# Patient Record
Sex: FEMALE | Race: WHITE | NOT HISPANIC OR LATINO | Employment: FULL TIME | URBAN - METROPOLITAN AREA
[De-identification: names, ages, dates, MRNs, and addresses within clinical notes are randomized per-mention and may not be internally consistent; named-entity substitution may affect disease eponyms.]

---

## 2017-11-09 ENCOUNTER — GENERIC CONVERSION - ENCOUNTER (OUTPATIENT)
Dept: OTHER | Facility: OTHER | Age: 47
End: 2017-11-09

## 2018-01-11 NOTE — RESULT NOTES
Dear Jessica Rodriguez,   Your test results have returned and are listed below:      Discussion/Summary  Your results show some abnormalities  Your vitamin A level is elevated, which can lead to toxicity  Symptoms of having a high vitamin A include: nausea, vomiting, blurred vision, headache and vertigo  If you are experiencing these symptoms, please make an appointment to see your primary care provider  Discontinue all liver consumption and vitamin/mineral supplements that contain vitamin A   If you are taking a hair health vitamin, check to see if it contains vitamin A  It it contains additional vitamn A, discontinue taking it  A lab slip is enclosed to recheck your level again in one month  Your ferritin level is low, which is an indicator of your iron stores  Your iron stores are low which can lead to anemia  Recommend that you take a high potency iron supplement (65 mg of elemental iron) once per day for two weeks, then increase to twice per day for 2 to 4 weeks and increase to three times per day as tolerated  Iron is better absorbed with a source of vitamin C, such as orange juice  Calcium, coffee, and tea decrease absorption of iron and should be spaced 2 hours apart from iron supplements  Proton pump inhibitors ( such as omeprazole, lansoprazole, esomeprazole) should be taken separately from iron supplements as they decrease absorption of iron  As iron can be constipating, it is suggested that you take one to two over the counter stool softeners per day  Enclosed is a lab slip to recheck your levels again in 3 months  Your cholesterol/triglycerides are elevated, which can increase your risk of cardiovascular disease  Please continue to follow up with your primary care physician for further monitoring and evaluation  Your levels will be rechecked again at your annual follow up appointment          If you have any questions, please don't hesitate to call the office     Sincerely, Signatures   Electronically signed by : MARIE Streeter; Aug 25 2016  4:13PM EST                       (Author)    Electronically signed by :  NEO Herbert ; Aug 26 2016 11:22AM EST

## 2018-02-13 NOTE — MISCELLANEOUS
Message   Recorded as Task   Date: 11/08/2017 12:28 PM, Created By: Caity Plummer   Task Name: Follow Up   Assigned To: Annika Castillo   Regarding Patient: Burke Courtney, Status: Active   CommentArty Babe - 08 Nov 2017 12:28 PM     TASK CREATED  Please call patient to schedule 3 year f/u appointment with office  Thank you  Per assigned task, I left message for Nadja Maldonado patient about scheduling an appointment at our office since patient is overdue for a follow up  Active Problems    1  Acute maxillary sinusitis (461 0) (J01 00)   2  Concussion (850 9) (S06 0X9A)   3  Eczema (692 9) (L30 9)   4  Facial laceration (873 40) (S01 81XA)   5  Flu vaccine need (V04 81) (Z23)   6  Hypervitaminosis A (278 2) (E67 0)   7  Low ferritin level (790 6) (R79 0)   8  Need for prophylactic vaccination and inoculation against influenza (V04 81) (Z23)   9  Obesity (278 00) (E66 9)   10  Postgastrectomy malabsorption (579 3) (K91 2,Z90 3)   11  Pre-operative cardiovascular examination (V72 81) (Z01 810)   12  S/P laparoscopic sleeve gastrectomy (V45 86) (Z98 84)   13  Syncope (780 2) (R55)   14  Weight gain (783 1) (R63 5)    Current Meds   1  Amoxicillin 875 MG Oral Tablet; TAKE 1 TABLET EVERY 12 HOURS DAILY; Therapy: 09CQA1001 to (Evaluate:17May2016)  Requested for: 35XFX1022; Last   Rx:07May2016 Ordered   2  Biotin 5000 MCG Oral Capsule; TAKE 1 CAPSULE DAILY; Therapy: (Recorded:87Zdb7886) to Recorded   3  Calcium Citrate + D TABS Recorded   4  Fluticasone Propionate 50 MCG/ACT Nasal Suspension (Flonase); USE 2 SPRAYS IN   EACH NOSTRIL ONCE DAILY; Therapy: 57PCN8389 to (Evaluate:06Jun2016)  Requested for: 26DZV7512; Last   Rx:07May2016 Ordered   5  Iron Supplement TABS; Therapy: (Recorded:12Ktr8960) to Recorded   6  Multiple Vitamin TABS; Therapy: (Recorded:07Bvk7236) to Recorded   7  ZyrTEC Allergy 10 MG Oral Tablet; Therapy: (Recorded:08Kud8792) to Recorded    Allergies    1   Sulfa Drugs    Signatures   Electronically signed by : Lady Davis, ; Nov 9 2017 11:13AM EST                       (Author)

## 2018-10-19 ENCOUNTER — APPOINTMENT (OUTPATIENT)
Dept: LAB | Age: 48
End: 2018-10-19
Payer: COMMERCIAL

## 2018-10-19 ENCOUNTER — TRANSCRIBE ORDERS (OUTPATIENT)
Dept: ADMINISTRATIVE | Facility: HOSPITAL | Age: 48
End: 2018-10-19

## 2018-10-19 DIAGNOSIS — R23.2 HOT FLASHES: ICD-10-CM

## 2018-10-19 DIAGNOSIS — R23.2 HOT FLASHES: Primary | ICD-10-CM

## 2018-10-19 LAB — TSH SERPL DL<=0.05 MIU/L-ACNC: 1.55 UIU/ML (ref 0.36–3.74)

## 2018-10-19 PROCEDURE — 36415 COLL VENOUS BLD VENIPUNCTURE: CPT

## 2018-10-19 PROCEDURE — 84443 ASSAY THYROID STIM HORMONE: CPT

## 2020-01-29 ENCOUNTER — OFFICE VISIT (OUTPATIENT)
Dept: URGENT CARE | Age: 50
End: 2020-01-29
Payer: COMMERCIAL

## 2020-01-29 VITALS
DIASTOLIC BLOOD PRESSURE: 89 MMHG | WEIGHT: 205 LBS | TEMPERATURE: 98.4 F | SYSTOLIC BLOOD PRESSURE: 123 MMHG | HEIGHT: 63 IN | RESPIRATION RATE: 18 BRPM | BODY MASS INDEX: 36.32 KG/M2 | OXYGEN SATURATION: 100 % | HEART RATE: 74 BPM

## 2020-01-29 DIAGNOSIS — R68.89 FLU-LIKE SYMPTOMS: ICD-10-CM

## 2020-01-29 DIAGNOSIS — R05.9 COUGH: ICD-10-CM

## 2020-01-29 DIAGNOSIS — J32.9 VIRAL SINUSITIS: Primary | ICD-10-CM

## 2020-01-29 DIAGNOSIS — B97.89 VIRAL SINUSITIS: Primary | ICD-10-CM

## 2020-01-29 LAB — S PYO AG THROAT QL: NEGATIVE

## 2020-01-29 PROCEDURE — 87880 STREP A ASSAY W/OPTIC: CPT | Performed by: NURSE PRACTITIONER

## 2020-01-29 PROCEDURE — 99213 OFFICE O/P EST LOW 20 MIN: CPT | Performed by: NURSE PRACTITIONER

## 2020-01-29 PROCEDURE — 87070 CULTURE OTHR SPECIMN AEROBIC: CPT | Performed by: NURSE PRACTITIONER

## 2020-01-29 RX ORDER — MULTIVITAMIN
TABLET ORAL
COMMUNITY

## 2020-01-29 RX ORDER — FERROUS SULFATE 325(65) MG
1 TABLET ORAL DAILY
COMMUNITY

## 2020-01-29 RX ORDER — PREDNISONE 10 MG/1
TABLET ORAL
Qty: 21 TABLET | Refills: 0 | Status: SHIPPED | OUTPATIENT
Start: 2020-01-29 | End: 2021-04-02

## 2020-01-29 RX ORDER — BENZONATATE 100 MG/1
100 CAPSULE ORAL 3 TIMES DAILY PRN
Qty: 20 CAPSULE | Refills: 0 | Status: SHIPPED | OUTPATIENT
Start: 2020-01-29 | End: 2021-04-02

## 2020-01-29 NOTE — PATIENT INSTRUCTIONS
Take steroids in morning  Rest increase fluids  Tylenol motrin for fever and pain   Take zyrtec, allegra, or Claritin daily  Use flonase 1-2 sprays in each nare daily   Use nasal saline to the nose,   Use humidifer in room  Symptoms worsen go to ER  Rest      Influenza   WHAT YOU NEED TO KNOW:   Influenza (the flu) is an infection caused by the influenza virus  The flu is easily spread when an infected person coughs, sneezes, or has close contact with others  You may be able to spread the flu to others for 1 week or longer after signs or symptoms appear  DISCHARGE INSTRUCTIONS:   Call 911 for any of the following:   · You have trouble breathing, and your lips look purple or blue  · You have a seizure  Return to the emergency department if:   · You are dizzy, or you are urinating less or not at all  · You have a headache with a stiff neck, and you feel tired or confused  · You have new pain or pressure in your chest     · Your symptoms, such as shortness of breath, vomiting, or diarrhea, get worse  · Your symptoms, such as fever and coughing, seem to get better, but then get worse  Contact your healthcare provider if:   · You have new muscle pain or weakness  · You have questions or concerns about your condition or care  Medicines: You may need any of the following:  · Acetaminophen  decreases pain and fever  It is available without a doctor's order  Ask how much to take and how often to take it  Follow directions  Acetaminophen can cause liver damage if not taken correctly  · NSAIDs , such as ibuprofen, help decrease swelling, pain, and fever  This medicine is available with or without a doctor's order  NSAIDs can cause stomach bleeding or kidney problems in certain people  If you take blood thinner medicine, always ask your healthcare provider if NSAIDs are safe for you  Always read the medicine label and follow directions  · Antivirals  help fight a viral infection      · Take your medicine as directed  Contact your healthcare provider if you think your medicine is not helping or if you have side effects  Tell him or her if you are allergic to any medicine  Keep a list of the medicines, vitamins, and herbs you take  Include the amounts, and when and why you take them  Bring the list or the pill bottles to follow-up visits  Carry your medicine list with you in case of an emergency  Rest  as much as you can to help you recover  Drink liquids as directed  to help prevent dehydration  Ask how much liquid to drink each day and which liquids are best for you  Prevent the spread of influenza:   · Wash your hands often  Use soap and water  Wash your hands after you use the bathroom, change a child's diapers, or sneeze  Wash your hands before you prepare or eat food  Use gel hand cleanser when soap and water are not available  Do not touch your eyes, nose, or mouth unless you have washed your hands first            · Cover your mouth when you sneeze or cough  Cough into a tissue or the bend of your arm  · Clean shared items with a germ-killing   Clean table surfaces, doorknobs, and light switches  Do not share towels, silverware, and dishes with people who are sick  Wash bed sheets, towels, silverware, and dishes with soap and water  · Wear a mask  over your mouth and nose if you are sick or are near anyone who is sick  · Stay away from others  if you are sick  · Influenza vaccine  helps prevent influenza (flu)  Everyone older than 6 months should get a yearly influenza vaccine  Get the vaccine as soon as it is available, usually in September or October each year  Follow up with your healthcare provider as directed:  Write down your questions so you remember to ask them during your visits  © 2017 [employer] Information is for End User's use only and may not be sold, redistributed or otherwise used for commercial purposes   All illustrations and images included in [employer]fort[employer] 605 are the copyrighted property of [employer] , [employer]  or [employer]  The above information is an  only  It is not intended as medical advice for individual conditions or treatments  Talk to your doctor, nurse or pharmacist before following any medical regimen to see if it is safe and effective for you

## 2020-01-29 NOTE — PROGRESS NOTES
NAME: Moses Go is a 51 y o  female  : 1970    MRN: [id number]    /89 (BP Location: Right arm, Patient Position: Sitting, Cuff Size: Adult)   Pulse 74   Temp 98 4 °F (36 9 °C) (Tympanic)   Resp 18   Ht 5' 3" (1 6 m)   Wt 93 kg (205 lb)   SpO2 100%   BMI 36 31 kg/m²     Assessment and Plan   Viral sinusitis [J32 9, B97 89]  1  Viral sinusitis  predniSONE 10 mg tablet   2  Flu-like symptoms     3  Cough  benzonatate (TESSALON PERLES) 100 mg capsule    POCT rapid strepA    Throat culture       Winter was seen today for cough  Diagnoses and all orders for this visit:    Viral sinusitis  -     predniSONE 10 mg tablet; Take 6 tablets today and decrease by one tablet daily until gone    Flu-like symptoms    Cough  -     benzonatate (TESSALON PERLES) 100 mg capsule; Take 1 capsule (100 mg total) by mouth 3 (three) times a day as needed for cough  -     POCT rapid strepA  -     Throat culture        Patient Instructions   Patient Instructions     Take steroids in morning  Rest increase fluids  Tylenol motrin for fever and pain   Take zyrtec, allegra, or Claritin daily  Use flonase 1-2 sprays in each nare daily   Use nasal saline to the nose,   Use humidifer in room  Symptoms worsen go to ER  Rest      Influenza   WHAT YOU NEED TO KNOW:   Influenza (the flu) is an infection caused by the influenza virus  The flu is easily spread when an infected person coughs, sneezes, or has close contact with others  You may be able to spread the flu to others for 1 week or longer after signs or symptoms appear  DISCHARGE INSTRUCTIONS:   Call 911 for any of the following:   · You have trouble breathing, and your lips look purple or blue  · You have a seizure  Return to the emergency department if:   · You are dizzy, or you are urinating less or not at all  · You have a headache with a stiff neck, and you feel tired or confused      · You have new pain or pressure in your chest     · Your symptoms, such as shortness of breath, vomiting, or diarrhea, get worse  · Your symptoms, such as fever and coughing, seem to get better, but then get worse  Contact your healthcare provider if:   · You have new muscle pain or weakness  · You have questions or concerns about your condition or care  Medicines: You may need any of the following:  · Acetaminophen  decreases pain and fever  It is available without a doctor's order  Ask how much to take and how often to take it  Follow directions  Acetaminophen can cause liver damage if not taken correctly  · NSAIDs , such as ibuprofen, help decrease swelling, pain, and fever  This medicine is available with or without a doctor's order  NSAIDs can cause stomach bleeding or kidney problems in certain people  If you take blood thinner medicine, always ask your healthcare provider if NSAIDs are safe for you  Always read the medicine label and follow directions  · Antivirals  help fight a viral infection  · Take your medicine as directed  Contact your healthcare provider if you think your medicine is not helping or if you have side effects  Tell him or her if you are allergic to any medicine  Keep a list of the medicines, vitamins, and herbs you take  Include the amounts, and when and why you take them  Bring the list or the pill bottles to follow-up visits  Carry your medicine list with you in case of an emergency  Rest  as much as you can to help you recover  Drink liquids as directed  to help prevent dehydration  Ask how much liquid to drink each day and which liquids are best for you  Prevent the spread of influenza:   · Wash your hands often  Use soap and water  Wash your hands after you use the bathroom, change a child's diapers, or sneeze  Wash your hands before you prepare or eat food  Use gel hand cleanser when soap and water are not available   Do not touch your eyes, nose, or mouth unless you have washed your hands first            · Cover your mouth when you sneeze or cough  Cough into a tissue or the bend of your arm  · Clean shared items with a germ-killing   Clean table surfaces, doorknobs, and light switches  Do not share towels, silverware, and dishes with people who are sick  Wash bed sheets, towels, silverware, and dishes with soap and water  · Wear a mask  over your mouth and nose if you are sick or are near anyone who is sick  · Stay away from others  if you are sick  · Influenza vaccine  helps prevent influenza (flu)  Everyone older than 6 months should get a yearly influenza vaccine  Get the vaccine as soon as it is available, usually in September or October each year  Follow up with your healthcare provider as directed:  Write down your questions so you remember to ask them during your visits  © 2017 [employer] Information is for End User's use only and may not be sold, redistributed or otherwise used for commercial purposes  All illustrations and images included in CareNotes® are the copyrighted property of [employer]  or [employer]  The above information is an  only  It is not intended as medical advice for individual conditions or treatments  Talk to your doctor, nurse or pharmacist before following any medical regimen to see if it is safe and effective for you  Proceed to ER if symptoms worsen  Chief Complaint     Chief Complaint   Patient presents with    Cough     pt c/o cough, bodyaches, b/l ear fullness, and sore throat x5 days  pt did get flu shot this year  pt taking tylenol cold  History of Present Illness     52 yo female here today with cough and congestion for the past five days, no fever, and has had body aches and muscle aches, had a flu shot this season as well  She has been taking tylenol sinus for her symptoms little relief noted, she has b/l ear fullness  Review of Systems   Review of Systems   Constitutional: Positive for fatigue  Negative for chills and fever  HENT: Positive for congestion, ear pain (b/l), postnasal drip and sore throat  Negative for rhinorrhea, sinus pressure and sinus pain  Eyes: Positive for pain  Respiratory: Positive for cough  Negative for chest tightness and shortness of breath  Cardiovascular: Negative for chest pain  Gastrointestinal: Negative for diarrhea, nausea and vomiting  Genitourinary: Negative  Musculoskeletal: Positive for myalgias  Skin: Negative  Neurological: Positive for headaches  Current Medications       Current Outpatient Medications:     Calcium Citrate-Vitamin D (CALCIUM CITRATE + D) 250-200 MG-UNIT TABS, Take by mouth, Disp: , Rfl:     Cetirizine HCl 10 MG CAPS, Take 10 mg by mouth, Disp: , Rfl:     ferrous sulfate (IRON SUPPLEMENT) 325 (65 Fe) mg tablet, Take by mouth, Disp: , Rfl:     Multiple Vitamin tablet, Take by mouth, Disp: , Rfl:     benzonatate (TESSALON PERLES) 100 mg capsule, Take 1 capsule (100 mg total) by mouth 3 (three) times a day as needed for cough, Disp: 20 capsule, Rfl: 0    predniSONE 10 mg tablet, Take 6 tablets today and decrease by one tablet daily until gone, Disp: 21 tablet, Rfl: 0    Current Allergies     Allergies as of 01/29/2020 - Reviewed 01/29/2020   Allergen Reaction Noted    Sulfa antibiotics  07/12/2012              History reviewed  No pertinent past medical history  Past Surgical History:   Procedure Laterality Date    BARIATRIC SURGERY  2014    BREAST BIOPSY  1995    HYSTERECTOMY  2018       History reviewed  No pertinent family history  Medications have been verified      The following portions of the patient's history were reviewed and updated as appropriate: allergies, current medications, past family history, past medical history, past social history, past surgical history and problem list     Objective   /89 (BP Location: Right arm, Patient Position: Sitting, Cuff Size: Adult)   Pulse 74   Temp 98 4 °F (36 9 °C) (Tympanic)   Resp 18   Ht 5' 3" (1 6 m)   Wt 93 kg (205 lb)   SpO2 100%   BMI 36 31 kg/m²      Physical Exam     Physical Exam   Constitutional: She is oriented to person, place, and time  She appears well-developed and well-nourished  She is cooperative  HENT:   Head: Normocephalic  Right Ear: Hearing, tympanic membrane, external ear and ear canal normal    Left Ear: Hearing, tympanic membrane, external ear and ear canal normal    Nose: Mucosal edema (pink and boggy) present  Right sinus exhibits no maxillary sinus tenderness  Left sinus exhibits no maxillary sinus tenderness  Mouth/Throat: Uvula is midline and mucous membranes are normal  No posterior oropharyngeal edema or posterior oropharyngeal erythema  Tonsils are 0 on the right  Tonsils are 0 on the left  No tonsillar exudate  Eyes: Pupils are equal, round, and reactive to light  Conjunctivae and EOM are normal    Neck: Normal range of motion  No erythema present  No thyroid mass present  Cardiovascular: Normal rate, regular rhythm and normal heart sounds  Pulmonary/Chest: Effort normal and breath sounds normal  No stridor  She has no decreased breath sounds  She has no wheezes  Musculoskeletal:   Body aches and muscle aches     Neurological: She is alert and oriented to person, place, and time         NEETA Montgomery

## 2020-01-29 NOTE — LETTER
January 29, 2020     Patient: Maria Esther Begin   YOB: 1970   Date of Visit: 1/29/2020       To Whom It May Concern: It is my medical opinion that Eli Apley may return to work on 01/31/2020 if fever free if not please excuse till 02/03/2020      Sincerely,        Inga Primrose, CRNP    CC: No Recipients

## 2020-01-31 LAB — BACTERIA THROAT CULT: NORMAL

## 2020-12-28 ENCOUNTER — OFFICE VISIT (OUTPATIENT)
Dept: URGENT CARE | Age: 50
End: 2020-12-28
Payer: COMMERCIAL

## 2020-12-28 VITALS
TEMPERATURE: 98 F | BODY MASS INDEX: 36.32 KG/M2 | WEIGHT: 205 LBS | HEART RATE: 78 BPM | OXYGEN SATURATION: 100 % | RESPIRATION RATE: 18 BRPM | HEIGHT: 63 IN

## 2020-12-28 DIAGNOSIS — B34.9 VIRAL SYNDROME: Primary | ICD-10-CM

## 2020-12-28 PROCEDURE — 99213 OFFICE O/P EST LOW 20 MIN: CPT | Performed by: PHYSICIAN ASSISTANT

## 2020-12-28 PROCEDURE — U0003 INFECTIOUS AGENT DETECTION BY NUCLEIC ACID (DNA OR RNA); SEVERE ACUTE RESPIRATORY SYNDROME CORONAVIRUS 2 (SARS-COV-2) (CORONAVIRUS DISEASE [COVID-19]), AMPLIFIED PROBE TECHNIQUE, MAKING USE OF HIGH THROUGHPUT TECHNOLOGIES AS DESCRIBED BY CMS-2020-01-R: HCPCS | Performed by: PHYSICIAN ASSISTANT

## 2020-12-29 LAB — SARS-COV-2 RNA SPEC QL NAA+PROBE: DETECTED

## 2021-03-26 DIAGNOSIS — Z23 ENCOUNTER FOR IMMUNIZATION: ICD-10-CM

## 2021-03-29 ENCOUNTER — RA CDI HCC (OUTPATIENT)
Dept: OTHER | Facility: HOSPITAL | Age: 51
End: 2021-03-29

## 2021-03-29 NOTE — PROGRESS NOTES
[facility], [place] [facility]  coding oppertunities          Chart reviewed, no opportunity found: CHART REVIEWED, NO OPPORTUNITY FOUND

## 2021-04-02 ENCOUNTER — APPOINTMENT (OUTPATIENT)
Dept: RADIOLOGY | Facility: MEDICAL CENTER | Age: 51
End: 2021-04-02
Payer: COMMERCIAL

## 2021-04-02 ENCOUNTER — OFFICE VISIT (OUTPATIENT)
Dept: FAMILY MEDICINE CLINIC | Facility: CLINIC | Age: 51
End: 2021-04-02
Payer: COMMERCIAL

## 2021-04-02 VITALS
WEIGHT: 212 LBS | DIASTOLIC BLOOD PRESSURE: 80 MMHG | HEIGHT: 63 IN | SYSTOLIC BLOOD PRESSURE: 128 MMHG | HEART RATE: 60 BPM | BODY MASS INDEX: 37.56 KG/M2 | OXYGEN SATURATION: 98 % | TEMPERATURE: 97.3 F

## 2021-04-02 DIAGNOSIS — K91.2 POSTGASTRECTOMY MALABSORPTION: ICD-10-CM

## 2021-04-02 DIAGNOSIS — Z90.3 POSTGASTRECTOMY MALABSORPTION: ICD-10-CM

## 2021-04-02 DIAGNOSIS — R79.0 LOW FERRITIN LEVEL: ICD-10-CM

## 2021-04-02 DIAGNOSIS — Z12.4 CERVICAL CANCER SCREENING: ICD-10-CM

## 2021-04-02 DIAGNOSIS — Z11.4 SCREENING FOR HIV (HUMAN IMMUNODEFICIENCY VIRUS): ICD-10-CM

## 2021-04-02 DIAGNOSIS — G47.00 INSOMNIA, UNSPECIFIED TYPE: ICD-10-CM

## 2021-04-02 DIAGNOSIS — M25.562 CHRONIC PAIN OF LEFT KNEE: ICD-10-CM

## 2021-04-02 DIAGNOSIS — E67.0 HYPERVITAMINOSIS A: ICD-10-CM

## 2021-04-02 DIAGNOSIS — G89.29 CHRONIC PAIN OF LEFT KNEE: ICD-10-CM

## 2021-04-02 DIAGNOSIS — E66.09 CLASS 2 OBESITY DUE TO EXCESS CALORIES WITH BODY MASS INDEX (BMI) OF 37.0 TO 37.9 IN ADULT, UNSPECIFIED WHETHER SERIOUS COMORBIDITY PRESENT: ICD-10-CM

## 2021-04-02 DIAGNOSIS — Z76.89 ENCOUNTER TO ESTABLISH CARE: Primary | ICD-10-CM

## 2021-04-02 PROCEDURE — 1036F TOBACCO NON-USER: CPT | Performed by: PHYSICIAN ASSISTANT

## 2021-04-02 PROCEDURE — 73564 X-RAY EXAM KNEE 4 OR MORE: CPT

## 2021-04-02 PROCEDURE — 3725F SCREEN DEPRESSION PERFORMED: CPT | Performed by: PHYSICIAN ASSISTANT

## 2021-04-02 PROCEDURE — 3008F BODY MASS INDEX DOCD: CPT | Performed by: PHYSICIAN ASSISTANT

## 2021-04-02 PROCEDURE — 99203 OFFICE O/P NEW LOW 30 MIN: CPT | Performed by: PHYSICIAN ASSISTANT

## 2021-04-02 RX ORDER — CHOLECALCIFEROL (VITAMIN D3) 125 MCG
1 CAPSULE ORAL 2 TIMES DAILY
COMMUNITY
Start: 2014-07-07

## 2021-04-02 RX ORDER — CHLORAL HYDRATE 500 MG
1 CAPSULE ORAL 2 TIMES DAILY
COMMUNITY
Start: 2020-01-01

## 2021-04-02 RX ORDER — DIPHENHYDRAMINE HYDROCHLORIDE 25 MG/1
1 TABLET ORAL 2 TIMES DAILY
COMMUNITY
Start: 2014-07-07

## 2021-04-02 RX ORDER — CYANOCOBALAMIN (VITAMIN B-12) 1000 MCG
1 TABLET ORAL DAILY
COMMUNITY
Start: 2021-02-01

## 2021-04-02 RX ORDER — PROPRANOLOL/HYDROCHLOROTHIAZID 40 MG-25MG
1 TABLET ORAL DAILY
COMMUNITY

## 2021-04-03 ENCOUNTER — APPOINTMENT (OUTPATIENT)
Dept: LAB | Age: 51
End: 2021-04-03
Payer: COMMERCIAL

## 2021-04-03 DIAGNOSIS — Z90.3 POSTGASTRECTOMY MALABSORPTION: ICD-10-CM

## 2021-04-03 DIAGNOSIS — Z11.4 SCREENING FOR HIV (HUMAN IMMUNODEFICIENCY VIRUS): ICD-10-CM

## 2021-04-03 DIAGNOSIS — K91.2 POSTGASTRECTOMY MALABSORPTION: ICD-10-CM

## 2021-04-03 PROBLEM — Z91.411: Status: ACTIVE | Noted: 2021-04-03

## 2021-04-03 PROBLEM — G47.00 INSOMNIA: Status: ACTIVE | Noted: 2021-04-03

## 2021-04-03 PROBLEM — IMO0002: Status: ACTIVE | Noted: 2021-04-03

## 2021-04-03 PROBLEM — M25.562 LEFT KNEE PAIN: Status: ACTIVE | Noted: 2021-04-03

## 2021-04-03 LAB
25(OH)D3 SERPL-MCNC: 68.8 NG/ML (ref 30–100)
ALBUMIN SERPL BCP-MCNC: 3.9 G/DL (ref 3.5–5)
ALP SERPL-CCNC: 63 U/L (ref 46–116)
ALT SERPL W P-5'-P-CCNC: 24 U/L (ref 12–78)
ANION GAP SERPL CALCULATED.3IONS-SCNC: 3 MMOL/L (ref 4–13)
AST SERPL W P-5'-P-CCNC: 18 U/L (ref 5–45)
BASOPHILS # BLD AUTO: 0.05 THOUSANDS/ΜL (ref 0–0.1)
BASOPHILS NFR BLD AUTO: 1 % (ref 0–1)
BILIRUB SERPL-MCNC: 0.59 MG/DL (ref 0.2–1)
BUN SERPL-MCNC: 13 MG/DL (ref 5–25)
CALCIUM SERPL-MCNC: 9.5 MG/DL (ref 8.3–10.1)
CHLORIDE SERPL-SCNC: 102 MMOL/L (ref 100–108)
CHOLEST SERPL-MCNC: 237 MG/DL (ref 50–200)
CO2 SERPL-SCNC: 29 MMOL/L (ref 21–32)
CREAT SERPL-MCNC: 0.81 MG/DL (ref 0.6–1.3)
EOSINOPHIL # BLD AUTO: 0.23 THOUSAND/ΜL (ref 0–0.61)
EOSINOPHIL NFR BLD AUTO: 6 % (ref 0–6)
ERYTHROCYTE [DISTWIDTH] IN BLOOD BY AUTOMATED COUNT: 12.7 % (ref 11.6–15.1)
EST. AVERAGE GLUCOSE BLD GHB EST-MCNC: 103 MG/DL
FERRITIN SERPL-MCNC: 99 NG/ML (ref 8–388)
FOLATE SERPL-MCNC: >20 NG/ML (ref 3.1–17.5)
GFR SERPL CREATININE-BSD FRML MDRD: 86 ML/MIN/1.73SQ M
GLUCOSE P FAST SERPL-MCNC: 91 MG/DL (ref 65–99)
HBA1C MFR BLD: 5.2 %
HCT VFR BLD AUTO: 42.7 % (ref 34.8–46.1)
HDLC SERPL-MCNC: 98 MG/DL
HGB BLD-MCNC: 13.5 G/DL (ref 11.5–15.4)
IMM GRANULOCYTES # BLD AUTO: 0.01 THOUSAND/UL (ref 0–0.2)
IMM GRANULOCYTES NFR BLD AUTO: 0 % (ref 0–2)
IRON SERPL-MCNC: 115 UG/DL (ref 50–170)
LDLC SERPL CALC-MCNC: 127 MG/DL (ref 0–100)
LYMPHOCYTES # BLD AUTO: 1.75 THOUSANDS/ΜL (ref 0.6–4.47)
LYMPHOCYTES NFR BLD AUTO: 43 % (ref 14–44)
MAGNESIUM SERPL-MCNC: 2 MG/DL (ref 1.6–2.6)
MCH RBC QN AUTO: 30.1 PG (ref 26.8–34.3)
MCHC RBC AUTO-ENTMCNC: 31.6 G/DL (ref 31.4–37.4)
MCV RBC AUTO: 95 FL (ref 82–98)
MONOCYTES # BLD AUTO: 0.26 THOUSAND/ΜL (ref 0.17–1.22)
MONOCYTES NFR BLD AUTO: 6 % (ref 4–12)
NEUTROPHILS # BLD AUTO: 1.78 THOUSANDS/ΜL (ref 1.85–7.62)
NEUTS SEG NFR BLD AUTO: 44 % (ref 43–75)
NRBC BLD AUTO-RTO: 0 /100 WBCS
PLATELET # BLD AUTO: 243 THOUSANDS/UL (ref 149–390)
PMV BLD AUTO: 10.6 FL (ref 8.9–12.7)
POTASSIUM SERPL-SCNC: 4.1 MMOL/L (ref 3.5–5.3)
PROT SERPL-MCNC: 7.6 G/DL (ref 6.4–8.2)
PTH-INTACT SERPL-MCNC: 39.7 PG/ML (ref 18.4–80.1)
RBC # BLD AUTO: 4.48 MILLION/UL (ref 3.81–5.12)
SODIUM SERPL-SCNC: 134 MMOL/L (ref 136–145)
TIBC SERPL-MCNC: 341 UG/DL (ref 250–450)
TRIGL SERPL-MCNC: 60 MG/DL
TSH SERPL DL<=0.05 MIU/L-ACNC: 1 UIU/ML (ref 0.36–3.74)
VIT B12 SERPL-MCNC: 793 PG/ML (ref 100–900)
WBC # BLD AUTO: 4.08 THOUSAND/UL (ref 4.31–10.16)

## 2021-04-03 PROCEDURE — 82746 ASSAY OF FOLIC ACID SERUM: CPT

## 2021-04-03 PROCEDURE — 83540 ASSAY OF IRON: CPT

## 2021-04-03 PROCEDURE — 85025 COMPLETE CBC W/AUTO DIFF WBC: CPT

## 2021-04-03 PROCEDURE — 83970 ASSAY OF PARATHORMONE: CPT

## 2021-04-03 PROCEDURE — 84443 ASSAY THYROID STIM HORMONE: CPT

## 2021-04-03 PROCEDURE — 82728 ASSAY OF FERRITIN: CPT

## 2021-04-03 PROCEDURE — 36415 COLL VENOUS BLD VENIPUNCTURE: CPT

## 2021-04-03 PROCEDURE — 83036 HEMOGLOBIN GLYCOSYLATED A1C: CPT

## 2021-04-03 PROCEDURE — 87389 HIV-1 AG W/HIV-1&-2 AB AG IA: CPT

## 2021-04-03 PROCEDURE — 84425 ASSAY OF VITAMIN B-1: CPT

## 2021-04-03 PROCEDURE — 83550 IRON BINDING TEST: CPT

## 2021-04-03 PROCEDURE — 82306 VITAMIN D 25 HYDROXY: CPT

## 2021-04-03 PROCEDURE — 80061 LIPID PANEL: CPT

## 2021-04-03 PROCEDURE — 80053 COMPREHEN METABOLIC PANEL: CPT

## 2021-04-03 PROCEDURE — 83735 ASSAY OF MAGNESIUM: CPT

## 2021-04-03 PROCEDURE — 82607 VITAMIN B-12: CPT

## 2021-04-03 PROCEDURE — 84590 ASSAY OF VITAMIN A: CPT

## 2021-04-03 NOTE — ASSESSMENT & PLAN NOTE
We reviewed her sleep habits and improvements that she can make with her routine such as avoiding electronics for an hour prior to bed and having a consistent bedtime  She will work on making these changes and will re-evaluate at next visit  New medication started at this time

## 2021-04-03 NOTE — ASSESSMENT & PLAN NOTE
Patient will go for labs as ordered today  She reports continuing her vitamins as previously directed

## 2021-04-03 NOTE — ASSESSMENT & PLAN NOTE
Began in November blood doing running program, improved with chiropractor and has since recurred  She was reassured that her exam was overall normal   She had tenderness in the lateral left knee, but did not have evidence of a lateral meniscal injury or tear of the lateral collateral ligament  She was sent for x-ray of the left knee  Would recommend physical therapy if x-ray is unremarkable

## 2021-04-03 NOTE — ASSESSMENT & PLAN NOTE
Status post bariatric surgery in 2014  Patient notes frustration with weight gain  She was referred to weight management for assistance with getting back on track as well as in follow-up from a letter she received last year about needing a possible EGD to evaluate for complications from the sleeve gastrectomy  BMI Counseling: Body mass index is 37 55 kg/m²  The BMI is above normal  Nutrition recommendations include decreasing overall calorie intake  Exercise recommendations include moderate aerobic physical activity for 150 minutes/week and exercising 3-5 times per week  Patient referred to weight management due to patient being obese
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
## 2021-04-05 LAB — HIV 1+2 AB+HIV1 P24 AG SERPL QL IA: NORMAL

## 2021-04-06 DIAGNOSIS — G89.29 CHRONIC PAIN OF LEFT KNEE: Primary | ICD-10-CM

## 2021-04-06 DIAGNOSIS — M25.562 CHRONIC PAIN OF LEFT KNEE: Primary | ICD-10-CM

## 2021-04-10 LAB
VIT A SERPL-MCNC: 62.7 UG/DL (ref 20.1–62)
VIT B1 BLD-SCNC: 143.4 NMOL/L (ref 66.5–200)

## 2021-05-03 ENCOUNTER — EVALUATION (OUTPATIENT)
Dept: PHYSICAL THERAPY | Facility: REHABILITATION | Age: 51
End: 2021-05-03
Payer: COMMERCIAL

## 2021-05-03 DIAGNOSIS — G89.29 CHRONIC PAIN OF LEFT KNEE: ICD-10-CM

## 2021-05-03 DIAGNOSIS — M25.562 CHRONIC PAIN OF LEFT KNEE: ICD-10-CM

## 2021-05-03 PROCEDURE — 97110 THERAPEUTIC EXERCISES: CPT | Performed by: PHYSICAL MEDICINE & REHABILITATION

## 2021-05-03 PROCEDURE — 97161 PT EVAL LOW COMPLEX 20 MIN: CPT | Performed by: PHYSICAL MEDICINE & REHABILITATION

## 2021-05-03 NOTE — PROGRESS NOTES
PT Evaluation     Today's date: 5/3/2021  Patient name: Liu Thomas  : 1970  MRN: [id number]  Referring provider: Adrienne Patel PA-C  Dx:   Encounter Diagnosis     ICD-10-CM    1  Chronic pain of left knee  M25 562 Ambulatory referral to Physical Therapy    G89 29        Start Time: 1600  Stop Time: 1645  Total time in clinic (min): 45 minutes    Assessment  Assessment details: Pt is a 53 y o  female presenting to outpatient physical therapy with Chronic pain of left knee   Pt presents with pain, decreased range of motion, decreased strength, and decreased tolerance to activity  Pt would benefit from skilled physical therapy to address limitations and to achieve goals  Thank you for this referral    Impairments: abnormal gait, abnormal or restricted ROM, impaired balance, impaired physical strength and lacks appropriate home exercise program    Symptom irritability: highUnderstanding of Dx/Px/POC: fair   Prognosis: good    Goals  ST  Patient will report 25% decrease in pain in 4 weeks  2  Patient will demonstrate 25% improvement in ROM in 4 weeks  3  Patient will demonstrate 1/2 grade improvement in strength in 4 weeks  LT  Patient will be able to perform IADLS without restriction or pain by discharge  2  Patient will be independent in HEP by discharge  3  Patient will be able to return to recreational/work duties without restriction or pain by discharge        Plan  Patient would benefit from: PT eval and skilled physical therapy  Planned modality interventions: biofeedback, cryotherapy and thermotherapy: hydrocollator packs  Planned therapy interventions: abdominal trunk stabilization, joint mobilization, manual therapy, neuromuscular re-education, patient education, postural training, strengthening, stretching, therapeutic activities, therapeutic exercise, home exercise program, gait training, balance and balance/weight bearing training  Frequency: 2x week  Duration in weeks: 4  Treatment plan discussed with: patient        Subjective Evaluation    History of Present Illness  Mechanism of injury: Pt reports experiencing R knee pain for > 1 year  She took part in a running program last year, she began having sxs 10 weeks into the program  She was treated by a chiropractor focusing on her hips, this helped reduce her pain  She had COVID in December and began feeling the pain again after she recovered  Currently she does not have pain, and she has not experienced pain for > 2 weeks  Sxs reported in the anterior knee, it feels like grinding under the knee cap  Sxs are worsened when descending stairs, or sitting  Following ambulation for 25 minutes she reports fatigue, and muscle soreness in B anterior thighs  Pain  Current pain ratin  At best pain ratin  At worst pain rating: 10  Quality: grinding  Progression: improved    Patient Goals  Patient goals for therapy: decreased pain  Patient goal: Ability to descend stairs without onset of pain  Objective     Active Range of Motion   Left Knee   Flexion: 115 degrees   Extension: 0 degrees     Right Knee   Flexion: 120 degrees   Extension: 0 degrees     Strength/Myotome Testing     Left Hip   Planes of Motion   Extension: 4  Abduction: 4-    Right Hip   Planes of Motion   Extension: 4  Abduction: 4    Left Knee   Prone flexion: 4-  Extension: 4  Quadriceps contraction: poor    Right Knee   Prone flexion: 4  Extension: 4+  Quadriceps contraction: good    Tests     Left Hip   Positive SANTOS         Flowsheet Rows      Most Recent Value   PT/OT G-Codes   Current Score  39   Projected Score  60             Precautions: none      Manuals                                                                 Neuro Re-Ed             Tandem on foam             SLS             Hip abd iso c pball                                                                 Ther Ex             Bike             Hamstring stretch             Standing gastroc stretch             Prone quad stretch              LAQ             Leg press             TB claespinozal             LE ext             LE curl                          Ther Activity             Step ups             Eccentric step downs              Gait Training                                       Modalities

## 2021-05-05 ENCOUNTER — OFFICE VISIT (OUTPATIENT)
Dept: PHYSICAL THERAPY | Facility: REHABILITATION | Age: 51
End: 2021-05-05
Payer: COMMERCIAL

## 2021-05-05 DIAGNOSIS — G89.29 CHRONIC PAIN OF LEFT KNEE: Primary | ICD-10-CM

## 2021-05-05 DIAGNOSIS — M25.562 CHRONIC PAIN OF LEFT KNEE: Primary | ICD-10-CM

## 2021-05-05 PROCEDURE — 97112 NEUROMUSCULAR REEDUCATION: CPT | Performed by: PHYSICAL MEDICINE & REHABILITATION

## 2021-05-05 PROCEDURE — 97110 THERAPEUTIC EXERCISES: CPT | Performed by: PHYSICAL MEDICINE & REHABILITATION

## 2021-05-05 NOTE — PROGRESS NOTES
Daily Note     Today's date: 2021  Patient name: Sukhwinder Beckham  : 1970  MRN: [id number]  Referring provider: Denise Simons PA-C  Dx:   Encounter Diagnosis     ICD-10-CM    1  Chronic pain of left knee  M25 562     G89 29        Start Time: 1600  Stop Time: 1640  Total time in clinic (min): 40 minutes    Subjective: Pt reports compliance with HEP and states that she has noticed a significant difference between R and L strength  Objective: See treatment diary below      Assessment: Tolerated treatment well  Patient demonstrated fatigue post treatment, exhibited good technique with therapeutic exercises and would benefit from continued PT  Verbal/tactile cueing utilized to reduce valgus collapse with leg press  Deficits in L NM control in tandem stance and SLS  Plan: Progress treatment as tolerated         Precautions: none      Manuals                                                                 Neuro Re-Ed             Tandem on foam 2 x 30" ea            SLS 5 x 10" ea            Hip abd iso c pball                                                                 Ther Ex             Bike 6'            Hamstring stretch HEP            Standing gastroc stretch HEP            Prone quad stretch  3 x 30"            LAQ HEP            Leg press 44#  2 x 10            TB clamshell GTB  2 x 8 ea            LE ext             LE curl             Standing knee flexion 10 ea            Ther Activity             Step ups nv              Eccentric step downs  nv            Gait Training                                       Modalities

## 2021-05-10 ENCOUNTER — OFFICE VISIT (OUTPATIENT)
Dept: PHYSICAL THERAPY | Facility: REHABILITATION | Age: 51
End: 2021-05-10
Payer: COMMERCIAL

## 2021-05-10 DIAGNOSIS — G89.29 CHRONIC PAIN OF LEFT KNEE: Primary | ICD-10-CM

## 2021-05-10 DIAGNOSIS — M25.562 CHRONIC PAIN OF LEFT KNEE: Primary | ICD-10-CM

## 2021-05-10 PROCEDURE — 97112 NEUROMUSCULAR REEDUCATION: CPT

## 2021-05-10 PROCEDURE — 97110 THERAPEUTIC EXERCISES: CPT

## 2021-05-10 NOTE — PROGRESS NOTES
Daily Note     Today's date: 5/10/2021  Patient name: Kay Feldman  : 1970  MRN: [id number]  Referring provider: Adrienne Ruiz PA-C  Dx:   Encounter Diagnosis     ICD-10-CM    1  Chronic pain of left knee  M25 562     G89 29                   Subjective: pt reports having a dance-a-thon over the weekend with little to no difficulty  She currently reports ability to ascend stairs with a laundry basket with less difficulty and pain  She currently reports posterior L knee pain  Objective: See treatment diary below      Assessment: Tolerated progressions and treatment well  Patient demonstrated fatigue post treatment, exhibited good technique with therapeutic exercises and would benefit from continued PT      Plan: Continue per plan of care  Progress treatment as tolerated         Precautions: none      Manuals  5/10                                                               Neuro Re-Ed             Tandem on foam 2 x 30" ea 30"x3           SLS 5 x 10" ea 10"x5 ea           Hip abd iso c pball  nv                                                               Ther Ex             Bike 6' 5'           Hamstring stretch HEP ---           Standing gastroc stretch HEP ---           Prone quad stretch  3 x 30" 30"x3           LAQ HEP ---           Leg press 44#  2 x 10 44# 2x10           TB clamshell GTB  2 x 8 ea gtb 5" 3x10           LE ext  22# 2x10           LE curl  22# 2x10           Standing knee flexion 10 ea            Ther Activity             Step ups nv   1R x10 L           Eccentric step downs  nv 0R x10 L           Gait Training                                       Modalities

## 2021-05-12 ENCOUNTER — OFFICE VISIT (OUTPATIENT)
Dept: PHYSICAL THERAPY | Facility: REHABILITATION | Age: 51
End: 2021-05-12
Payer: COMMERCIAL

## 2021-05-12 DIAGNOSIS — M25.562 CHRONIC PAIN OF LEFT KNEE: Primary | ICD-10-CM

## 2021-05-12 DIAGNOSIS — G89.29 CHRONIC PAIN OF LEFT KNEE: Primary | ICD-10-CM

## 2021-05-12 PROCEDURE — 97110 THERAPEUTIC EXERCISES: CPT | Performed by: PHYSICAL MEDICINE & REHABILITATION

## 2021-05-12 PROCEDURE — 97140 MANUAL THERAPY 1/> REGIONS: CPT | Performed by: PHYSICAL MEDICINE & REHABILITATION

## 2021-05-12 PROCEDURE — 97112 NEUROMUSCULAR REEDUCATION: CPT | Performed by: PHYSICAL MEDICINE & REHABILITATION

## 2021-05-12 NOTE — PROGRESS NOTES
Daily Note     Today's date: 2021  Patient name: Hung Berger  : 1970  MRN: [id number]  Referring provider: Rahat Pagan PA-C  Dx:   Encounter Diagnosis     ICD-10-CM    1  Chronic pain of left knee  M25 562     G89 29        Start Time: 1600  Stop Time: 1645  Total time in clinic (min): 45 minutes    Subjective: Pt states that she feels her hips are out of alignment leading to some knee irritation  She is scheduled for an appointment with her chiropractor next week  Objective: See treatment diary below      Assessment: Tolerated treatment well  Patient demonstrated fatigue post treatment, exhibited good technique with therapeutic exercises and would benefit from continued PT  Gentle isometrics and MET improved sxs in the lumbosacral region and allowed for completion of tx  Plan: Progress treatment as tolerated     Plan to transition to HEP in the upcoming week, attempt mini squats, wall sits, sit to stands in place of leg press     Precautions: none      Manuals  5/10 5/12                                                              Neuro Re-Ed             Tandem on foam 2 x 30" ea 30"x3 3 x 30"          SLS 5 x 10" ea 10"x5 ea           Hip abd iso c pball  nv                                                               Ther Ex             Bike 6' 5' 6'          Hamstring stretch HEP ---           Standing gastroc stretch HEP ---           Prone quad stretch  3 x 30" 30"x3           LAQ HEP ---           Leg press 44#  2 x 10 44# 2x10 66#  2 x 8 Wall sit/mini squat/sit to stand         TB clamshell GTB  2 x 8 ea gtb 5" 3x10           LE ext  22# 2x10 22#  3 x 10          LE curl  22# 2x10           Standing knee flexion 10 ea  2 x 10          Bridge c hip abd iso   perf HEP         Hip add iso   per HEP         Wall sit    nv         Mini squat    nv         Ther Activity             Sit to stand   nv          Step ups nv   1R x10 L           Eccentric step downs  nv 0R x10 L Gait Training                                       Modalities

## 2021-05-17 ENCOUNTER — OFFICE VISIT (OUTPATIENT)
Dept: PHYSICAL THERAPY | Facility: REHABILITATION | Age: 51
End: 2021-05-17
Payer: COMMERCIAL

## 2021-05-17 DIAGNOSIS — G89.29 CHRONIC PAIN OF LEFT KNEE: Primary | ICD-10-CM

## 2021-05-17 DIAGNOSIS — M25.562 CHRONIC PAIN OF LEFT KNEE: Primary | ICD-10-CM

## 2021-05-17 PROCEDURE — 97110 THERAPEUTIC EXERCISES: CPT

## 2021-05-17 PROCEDURE — 97112 NEUROMUSCULAR REEDUCATION: CPT

## 2021-05-17 NOTE — PROGRESS NOTES
Daily Note     Today's date: 2021  Patient name: Prem Quinonez  : 1970  MRN: [id number]  Referring provider: Felicia Thomas PA-C  Dx:   Encounter Diagnosis     ICD-10-CM    1  Chronic pain of left knee  M25 562     G89 29                   Subjective: pt reports tightness in L knee greater than R knee  She denied pain at present time  Pt reports continued difficulty with stair climbing, particularly descending stairs,      Objective: See treatment diary below      Assessment: Tolerated progressions and treatment well  Patient demonstrated fatigue post treatment, exhibited good technique with therapeutic exercises and would benefit from continued PT      Plan: Continue per plan of care  Progress treatment as tolerated         Precautions: none      Manuals  5/10 5/12 5/17                                                             Neuro Re-Ed             Tandem on foam 2 x 30" ea 30"x3 3 x 30" nv         SLS 5 x 10" ea 10"x5 ea           Hip abd iso c pball  nv                                                               Ther Ex             Bike 6' 5' 6' TM x5' 1 8 mph         Hamstring stretch HEP ---           Standing gastroc stretch HEP ---           Prone quad stretch  3 x 30" 30"x3           LAQ HEP ---                        Leg press 44#  2 x 10 44# 2x10 66#  2 x 8 Wall sit/mini squat/sit to stand         TB clamshell GTB  2 x 8 ea gtb 5" 3x10           LE ext  22# 2x10 22#  3 x 10 22#  3 x 10         LE curl  22# 2x10  22#  3 x 10         Standing knee flexion 10 ea  2 x 10          Bridge c hip abd iso   perf HEP         Hip add iso   per HEP         TB sidestepping    gtb 3 laps         Wall sit    5"2x10         Mini squat w/ TB hip abd/ext/diagonals    gtb x10 ea b/l         Ther Activity             Sit to stand   nv          Step ups nv   1R x10 L  1R x10 ea         Eccentric step downs  nv 0R x10 L  1R x10 ea         Gait Training                                       Modalities

## 2021-05-19 ENCOUNTER — OFFICE VISIT (OUTPATIENT)
Dept: PHYSICAL THERAPY | Facility: REHABILITATION | Age: 51
End: 2021-05-19
Payer: COMMERCIAL

## 2021-05-19 DIAGNOSIS — G89.29 CHRONIC PAIN OF LEFT KNEE: Primary | ICD-10-CM

## 2021-05-19 DIAGNOSIS — M25.562 CHRONIC PAIN OF LEFT KNEE: Primary | ICD-10-CM

## 2021-05-19 PROCEDURE — 97110 THERAPEUTIC EXERCISES: CPT | Performed by: PHYSICAL MEDICINE & REHABILITATION

## 2021-05-19 NOTE — PROGRESS NOTES
Daily Note     Today's date: 2021  Patient name: Marbin Anderson  : 1970  MRN: [id number]  Referring provider: Rayo Woo PA-C  Dx:   Encounter Diagnosis     ICD-10-CM    1  Chronic pain of left knee  M25 562     G89 29        Start Time:   Stop Time:   Total time in clinic (min): 45 minutes    Subjective: Pt states that her condition continues to improve  She has noticed some aching with prolonged standing,  but overall she is performing daily tasks with less difficulty  Objective: See treatment diary below      Assessment: Tolerated treatment well  Patient demonstrated fatigue post treatment, exhibited good technique with therapeutic exercises and would benefit from continued PT  Pt tolerated additions and progressions to tx without adverse sxs  Plan: Progress treatment as tolerated         Precautions: none      Manuals  5/10 5/12 5/17 5/19                                                            Neuro Re-Ed             Tandem on foam 2 x 30" ea 30"x3 3 x 30" nv         SLS 5 x 10" ea 10"x5 ea           Hip abd iso c pball  nv   c UE assist  5 x 5"                                                            Ther Ex             Bike 6' 5' 6' TM x5' 1 8 mph TM  X 6'  2 0 max        Prone quad stretch  3 x 30" 30"x3   10 x 10"        LAQ HEP ---                        Leg press 44#  2 x 10 44# 2x10 66#  2 x 8 Wall sit/mini squat/sit to stand         TB clamshell GTB  2 x 8 ea gtb 5" 3x10           LE ext  22# 2x10 22#  3 x 10 22#  3 x 10 33#  3 x 8        LE curl  22# 2x10  22#  3 x 10 33#  3 x 8        Bridge c hip abd iso   perf HEP         Hip add iso   per HEP         TB sidestepping    gtb 3 laps         Wall sit    5"2x10         Mini squat w/ TB hip abd/ext/diagonals    gtb x10 ea b/l c pball  3 x 8        Ther Activity             Sit to stand   nv          Step ups nv   1R x10 L  1R x10 ea         Eccentric step downs  nv 0R x10 L  1R x10 ea         Gait Training Modalities

## 2021-05-24 ENCOUNTER — OFFICE VISIT (OUTPATIENT)
Dept: PHYSICAL THERAPY | Facility: REHABILITATION | Age: 51
End: 2021-05-24
Payer: COMMERCIAL

## 2021-05-24 DIAGNOSIS — M25.562 CHRONIC PAIN OF LEFT KNEE: Primary | ICD-10-CM

## 2021-05-24 DIAGNOSIS — G89.29 CHRONIC PAIN OF LEFT KNEE: Primary | ICD-10-CM

## 2021-05-24 PROCEDURE — 97110 THERAPEUTIC EXERCISES: CPT

## 2021-05-24 NOTE — PROGRESS NOTES
Daily Note     Today's date: 2021  Patient name: Willam Marques  : 1970  MRN: [id number]  Referring provider: Narciso Oviedo PA-C  Dx:   Encounter Diagnosis     ICD-10-CM    1  Chronic pain of left knee  M25 562     G89 29                   Subjective: Pt reports increased swelling and soreness in B knees which she attributes to stepping outside onto her deck and her R foot feel through the board  She denied falling or injuring her knees, however, noted soreness in L quads due to how she caught herself  She noted icing yesterday which provided relief  Objective: See treatment diary below      Assessment: Tolerated treatment well  Held some exercises due to increased swelling today; resume NV as able  Patient demonstrated fatigue post treatment, exhibited good technique with therapeutic exercises and would benefit from continued PT      Plan: Continue per plan of care  Progress treatment as tolerated         Precautions: none      Manuals  5/10 5/12 5/17 5/19 5/24                                                           Neuro Re-Ed             Tandem on foam 2 x 30" ea 30"x3 3 x 30" nv         SLS 5 x 10" ea 10"x5 ea           Hip abd iso c pball  nv   c UE assist  5 x 5" c UE assist  5"x10                                                           Ther Ex             Bike 6' 5' 6' TM x5' 1 8 mph TM  X 6'  2 0 max x6 min       Prone quad stretch  3 x 30" 30"x3   10 x 10"        LAQ HEP ---                        Leg press 44#  2 x 10 44# 2x10 66#  2 x 8 Wall sit/mini squat/sit to stand         TB clamshell GTB  2 x 8 ea gtb 5" 3x10           LE ext  22# 2x10 22#  3 x 10 22#  3 x 10 33#  3 x 8 33# 3x10       LE curl  22# 2x10  22#  3 x 10 33#  3 x 8 22# 2x10       Bridge c hip abd iso   perf HEP         Hip add iso   per HEP         TB sidestepping    gtb 3 laps gtb 3 laps gtb 3 laps       Wall sit    5"2x10  nv       Mini squat w/ TB hip abd/ext/diagonals    gtb x10 ea b/l c pball  3 x 8 nv Ther Activity             Sit to stand   nv          Step ups nv   1R x10 L  1R x10 ea 1R x10 ea 1R x10ea       Eccentric step downs  nv 0R x10 L  1R x10 ea 1R x10 ea 1R x10 ea       Gait Training                                       Modalities

## 2021-05-26 ENCOUNTER — OFFICE VISIT (OUTPATIENT)
Dept: PHYSICAL THERAPY | Facility: REHABILITATION | Age: 51
End: 2021-05-26
Payer: COMMERCIAL

## 2021-05-26 DIAGNOSIS — M25.562 CHRONIC PAIN OF LEFT KNEE: Primary | ICD-10-CM

## 2021-05-26 DIAGNOSIS — G89.29 CHRONIC PAIN OF LEFT KNEE: Primary | ICD-10-CM

## 2021-05-26 PROCEDURE — 97112 NEUROMUSCULAR REEDUCATION: CPT

## 2021-05-26 PROCEDURE — 97110 THERAPEUTIC EXERCISES: CPT

## 2021-05-26 NOTE — PROGRESS NOTES
Daily Note     Today's date: 2021  Patient name: Emma Roque  : 1970  MRN: [id number]  Referring provider: Jaya Portillo PA-C  Dx:   Encounter Diagnosis     ICD-10-CM    1  Chronic pain of left knee  M25 562     G89 29                   Subjective: pt reports her knee is feeling better today  Denied pain prior to beginning today  Pt reports difficulty genuflecting during [employer] and avoid doing so because of fear  Objective: See treatment diary below      Assessment: Tolerated treatment well  Added static lunges to assist patient with genuflecting and promote increased eccentric quad strength; challenged doing so  Patient demonstrated fatigue post treatment, exhibited good technique with therapeutic exercises and would benefit from continued PT      Plan: Continue per plan of care  Progress treatment as tolerated         Precautions: none      Manuals  5/10 5/12 5/17 5/19 5/24 5/26                                                          Neuro Re-Ed             Tandem on foam 2 x 30" ea 30"x3 3 x 30" nv         SLS 5 x 10" ea 10"x5 ea           Hip abd iso c pball  nv   c UE assist  5 x 5" c UE assist  5"x10 c UE assist  5"x10                                                          Ther Ex             Bike 6' 5' 6' TM x5' 1 8 mph TM  X 6'  2 0 max x6 min x6 min      Prone quad stretch  3 x 30" 30"x3   10 x 10"        LAQ HEP ---                        Leg press 44#  2 x 10 44# 2x10 66#  2 x 8 Wall sit/mini squat/sit to stand         TB clamshell GTB  2 x 8 ea gtb 5" 3x10           LE ext  22# 2x10 22#  3 x 10 22#  3 x 10 33#  3 x 8 33# 3x10 33# 3x10  (ecc nv)      LE curl  22# 2x10  22#  3 x 10 33#  3 x 8 22# 2x10 33# 3x10      Bridge c hip abd iso   perf HEP         Hip add iso   per HEP         TB sidestepping    gtb 3 laps gtb 3 laps gtb 3 laps gtb 3 laps      Static lunge       x10 ea b/l      Wall sit    5"2x10  nv 5"2x10      Mini squat w/ TB hip abd/ext/diagonals    gtb x10 ea b/l c pball  3 x 8 nv gtb 2x10      Ther Activity             Sit to stand   nv          Step ups nv   1R x10 L  1R x10 ea 1R x10 ea 1R x10ea 2R x10 ea      Eccentric step downs  nv 0R x10 L  1R x10 ea 1R x10 ea 1R x10 ea 1R x10 ea      Gait Training                                       Modalities

## 2021-06-02 ENCOUNTER — OFFICE VISIT (OUTPATIENT)
Dept: PHYSICAL THERAPY | Facility: REHABILITATION | Age: 51
End: 2021-06-02
Payer: COMMERCIAL

## 2021-06-02 DIAGNOSIS — M25.562 CHRONIC PAIN OF LEFT KNEE: Primary | ICD-10-CM

## 2021-06-02 DIAGNOSIS — G89.29 CHRONIC PAIN OF LEFT KNEE: Primary | ICD-10-CM

## 2021-06-02 PROCEDURE — 97110 THERAPEUTIC EXERCISES: CPT

## 2021-06-02 NOTE — PROGRESS NOTES
Daily Note     Today's date: 2021  Patient name: Emma Roque  : 1970  MRN: [id number]  Referring provider: Jaya Portillo PA-C  Dx:   Encounter Diagnosis     ICD-10-CM    1  Chronic pain of left knee  M25 562     G89 29                   Subjective:  Patient reports that she was muscle sore for 2 days after her last visit but no knee pain  Patient notes that she still sometimes feels like something catches in her knee and she is hesitant to move it  She does note that this is happening less frequently  Objective: See treatment diary below      Assessment: Tolerated treatment well  Patient performed ex without reports of pain  Patient noted one episode of catching during ex     Patient demonstrated fatigue post treatment, exhibited good technique with therapeutic exercises and would benefit from continued PT to attain set goals  Plan: Continue per plan of care        Precautions: none      Manuals  5/10 5/12 5/17 5/19 5/24 5/26 6                                                         Neuro Re-Ed             Tandem on foam 2 x 30" ea 30"x3 3 x 30" nv         SLS 5 x 10" ea 10"x5 ea           Hip abd iso c pball  nv   c UE assist  5 x 5" c UE assist  5"x10 c UE assist  5"x10 c UE assist 5"x12                                                         Ther Ex             Bike 6' 5' 6' TM x5' 1 8 mph TM  X 6'  2 0 max x6 min x6 min x6 min     Prone quad stretch  3 x 30" 30"x3   10 x 10"        LAQ HEP ---                        Leg press 44#  2 x 10 44# 2x10 66#  2 x 8 Wall sit/mini squat/sit to stand         TB clamshell GTB  2 x 8 ea gtb 5" 3x10           LE ext  22# 2x10 22#  3 x 10 22#  3 x 10 33#  3 x 8 33# 3x10 33# 3x10  (ecc nv) 33# 2x10 ecc lowering     LE curl  22# 2x10  22#  3 x 10 33#  3 x 8 22# 2x10 33# 3x10 33# 3x10     Bridge c hip abd iso   perf HEP         Hip add iso   per HEP         TB sidestepping    gtb 3 laps gtb 3 laps gtb 3 laps gtb 3 laps GTB 4 laps     Static lunge       x10 ea b/l x10 b/l     Wall sit    5"2x10  nv 5"2x10      Mini squat w/ TB hip abd/ext/diagonals    gtb x10 ea b/l c pball  3 x 8 nv gtb 2x10 GTB  2x10     Ther Activity             Sit to stand   nv          Step ups nv   1R x10 L  1R x10 ea 1R x10 ea 1R x10ea 2R x10 ea 2R x12 ea     Eccentric step downs  nv 0R x10 L  1R x10 ea 1R x10 ea 1R x10 ea 1R x10 ea 1R x10 ea     Gait Training                                       Modalities

## 2021-06-07 ENCOUNTER — OFFICE VISIT (OUTPATIENT)
Dept: PHYSICAL THERAPY | Facility: REHABILITATION | Age: 51
End: 2021-06-07
Payer: COMMERCIAL

## 2021-06-07 DIAGNOSIS — M25.562 CHRONIC PAIN OF LEFT KNEE: Primary | ICD-10-CM

## 2021-06-07 DIAGNOSIS — G89.29 CHRONIC PAIN OF LEFT KNEE: Primary | ICD-10-CM

## 2021-06-07 PROCEDURE — 97110 THERAPEUTIC EXERCISES: CPT

## 2021-06-07 NOTE — PROGRESS NOTES
Daily Note     Today's date: 2021  Patient name: Naty Orosco  : 1970  MRN: [id number]  Referring provider: Ella García PA-C  Dx:   Encounter Diagnosis     ICD-10-CM    1  Chronic pain of left knee  M25 562     G89 29                   Subjective: pt reports with c/o increased anterior knee pain today  She noted getting up from her desk and turned quickly and felt a opopping sensation  She currently reports soreness/discomfort in [place]  Objective: See treatment diary below      Assessment: Tolerated treatment well  Modified treatment due to increased knee pain and reports of increased pain in [place]  Patient demonstrated fatigue post treatment, exhibited good technique with therapeutic exercises and would benefit from continued PT      Plan: Continue per plan of care  Progress treatment as tolerated         Precautions: none      Manuals  5/10 5/12 5/17 5/19 5/24 5/26 6/2 6/7                                                        Neuro Re-Ed             Tandem on foam 2 x 30" ea 30"x3 3 x 30" nv         SLS 5 x 10" ea 10"x5 ea           Hip abd iso c pball  nv   c UE assist  5 x 5" c UE assist  5"x10 c UE assist  5"x10 c UE assist 5"x12 c UE assist 5"x12                                                        Ther Ex             Bike 6' 5' 6' TM x5' 1 8 mph TM  X 6'  2 0 max x6 min x6 min x6 min x6 min    Prone quad stretch  3 x 30" 30"x3   10 x 10"    30"x3    LAQ HEP ---                        Leg press 44#  2 x 10 44# 2x10 66#  2 x 8 Wall sit/mini squat/sit to stand         [employer] hydrants         gtb x10 ea    TB bridges         gtb 5" 3x10    TB clamshell GTB  2 x 8 ea gtb 5" 3x10       gtb 5" 3x10    LE ext  22# 2x10 22#  3 x 10 22#  3 x 10 33#  3 x 8 33# 3x10 33# 3x10  (ecc nv) 33# 2x10 ecc lowering     LE curl  22# 2x10  22#  3 x 10 33#  3 x 8 22# 2x10 33# 3x10 33# 3x10     Bridge c hip abd iso   perf HEP         Hip add iso   per HEP         TB sidestepping    gtb 3 laps gtb 3 laps gtb 3 laps gtb 3 laps GTB 4 laps     Static lunge       x10 ea b/l x10 b/l     Wall sit    5"2x10  nv 5"2x10      Mini squat w/ TB hip abd/ext/diagonals    gtb x10 ea b/l c pball  3 x 8 nv gtb 2x10 GTB  2x10     Ther Activity             Sit to stand   nv          Step ups nv   1R x10 L  1R x10 ea 1R x10 ea 1R x10ea 2R x10 ea 2R x12 ea     Eccentric step downs  nv 0R x10 L  1R x10 ea 1R x10 ea 1R x10 ea 1R x10 ea 1R x10 ea     Gait Training                                       Modalities             CP         10 min

## 2021-06-09 ENCOUNTER — OFFICE VISIT (OUTPATIENT)
Dept: PHYSICAL THERAPY | Facility: REHABILITATION | Age: 51
End: 2021-06-09
Payer: COMMERCIAL

## 2021-06-09 DIAGNOSIS — G89.29 CHRONIC PAIN OF LEFT KNEE: Primary | ICD-10-CM

## 2021-06-09 DIAGNOSIS — M25.562 CHRONIC PAIN OF LEFT KNEE: Primary | ICD-10-CM

## 2021-06-09 PROCEDURE — 97110 THERAPEUTIC EXERCISES: CPT

## 2021-06-09 NOTE — PROGRESS NOTES
Daily Note     Today's date: 2021  Patient name: Roque Parry  : 1970  MRN: [id number]  Referring provider: Lord Scott PA-C  Dx:   Encounter Diagnosis     ICD-10-CM    1  Chronic pain of left knee  M25 562     G89 29                   Subjective: pt reports decreased knee pain today  She noted performing stretches and icing knee over the past couple days  Objective: See treatment diary below      Assessment: Tolerated treatment well  Resumed exercises as lsited with good response  Patient demonstrated fatigue post treatment, exhibited good technique with therapeutic exercises and would benefit from continued PT      Plan: Continue per plan of care  Progress treatment as tolerated         Precautions: none      Manuals  5/10 5/12 5/17 5/19 5/24 5/26 6/2 6/7 6/9                                                       Neuro Re-Ed             Tandem on foam 2 x 30" ea 30"x3 3 x 30" nv         SLS 5 x 10" ea 10"x5 ea           Hip abd iso c pball  nv   c UE assist  5 x 5" c UE assist  5"x10 c UE assist  5"x10 c UE assist 5"x12 c UE assist 5"x12 5" 2x10                                                       Ther Ex             Bike 6' 5' 6' TM x5' 1 8 mph TM  X 6'  2 0 max x6 min x6 min x6 min x6 min 6 min   Prone quad stretch  3 x 30" 30"x3   10 x 10"    30"x3    LAQ HEP ---                        Leg press 44#  2 x 10 44# 2x10 66#  2 x 8 Wall sit/mini squat/sit to stand         Fire hydrants         gtb x10 ea    TB bridges         gtb 5" 3x10    TB clamshell GTB  2 x 8 ea gtb 5" 3x10       gtb 5" 3x10    LE ext  22# 2x10 22#  3 x 10 22#  3 x 10 33#  3 x 8 33# 3x10 33# 3x10  (ecc nv) 33# 2x10 ecc lowering 33# 2x10 ecc lowering 33# 2x10 (ecc nv)   LE curl  22# 2x10  22#  3 x 10 33#  3 x 8 22# 2x10 33# 3x10 33# 3x10 33# 2x10 33# 2x10 (ecc nv)   Bridge c hip abd iso   perf HEP         Hip add iso   per HEP         TB sidestepping    gtb 3 laps gtb 3 laps gtb 3 laps gtb 3 laps GTB 4 laps  gtb x3 laps Static lunge       x10 ea b/l x10 b/l np    Wall sit    5"2x10  nv 5"2x10      Mini squat w/ TB hip abd/ext/diagonals    gtb x10 ea b/l c pball  3 x 8 nv gtb 2x10 GTB  2x10  gtb x10   Ther Activity             Sit to stand   nv          Step ups nv   1R x10 L  1R x10 ea 1R x10 ea 1R x10ea 2R x10 ea 2R x12 ea  2R 2x10   Eccentric step downs  nv 0R x10 L  1R x10 ea 1R x10 ea 1R x10 ea 1R x10 ea 1R x10 ea  1R 2x10   Gait Training                                       Modalities             CP         10 min 10 min

## 2021-06-21 ENCOUNTER — EVALUATION (OUTPATIENT)
Dept: PHYSICAL THERAPY | Facility: REHABILITATION | Age: 51
End: 2021-06-21
Payer: COMMERCIAL

## 2021-06-21 DIAGNOSIS — M25.562 CHRONIC PAIN OF LEFT KNEE: Primary | ICD-10-CM

## 2021-06-21 DIAGNOSIS — G89.29 CHRONIC PAIN OF LEFT KNEE: Primary | ICD-10-CM

## 2021-06-21 PROCEDURE — 97110 THERAPEUTIC EXERCISES: CPT | Performed by: PHYSICAL MEDICINE & REHABILITATION

## 2021-06-21 NOTE — PROGRESS NOTES
Daily Note     Today's date: 2021  Patient name: Efren Kemp  : 1970  MRN: [id number]  Referring provider: Chaka Encinas PA-C  Dx:   Encounter Diagnosis     ICD-10-CM    1  Chronic pain of left knee  M25 562     G89 29        Start Time: 0945  Stop Time: 1030  Total time in clinic (min): 45 minutes    Subjective: Pt states that she is doing well, she is leaving for vacation today and will be gone for 8 weeks  Objective: See treatment diary below      Assessment: Tolerated treatment well  Patient demonstrated fatigue post treatment, exhibited good technique with therapeutic exercises and would benefit from continued PT  Plan: DC to HEP     Precautions: none      Manuals 6/21 5/10 5/12 5/17 5/19 5/24 5/26 6/2 6/7 6/9                                                       Neuro Re-Ed             Tandem on foam  30"x3 3 x 30" nv         SLS  10"x5 ea           Hip abd iso c pball  nv   c UE assist  5 x 5" c UE assist  5"x10 c UE assist  5"x10 c UE assist 5"x12 c UE assist 5"x12 5" 2x10                                                       Ther Ex             Bike TM x 5 5' 6' TM x5' 1 8 mph TM  X 6'  2 0 max x6 min x6 min x6 min x6 min 6 min   Prone quad stretch  3 x 30" 30"x3   10 x 10"    30"x3    LAQ  ---                        Leg press  44# 2x10 66#  2 x 8 Wall sit/mini squat/sit to stand         [facility]   Clam  10        gtb x10 ea    TB bridges         gtb 5" 3x10    TB clamshell BTB  2 x 10 gtb 5" 3x10       gtb 5" 3x10    LE ext  22# 2x10 22#  3 x 10 22#  3 x 10 33#  3 x 8 33# 3x10 33# 3x10  (ecc nv) 33# 2x10 ecc lowering 33# 2x10 ecc lowering 33# 2x10 (ecc nv)   LE curl  22# 2x10  22#  3 x 10 33#  3 x 8 22# 2x10 33# 3x10 33# 3x10 33# 2x10 33# 2x10 (ecc nv)   Bridge c hip abd iso   perf HEP         Hip add iso   per HEP         TB sidestepping 3 laps   gtb 3 laps gtb 3 laps gtb 3 laps gtb 3 laps GTB 4 laps  gtb x3 laps   Static lunge       x10 ea b/l x10 b/l np    Wall sit 5"2x10  nv 5"2x10      Mini squat w/ TB hip abd/ext/diagonals    gtb x10 ea b/l c pball  3 x 8 nv gtb 2x10 GTB  2x10  gtb x10   Ther Activity             Sit to stand   nv          Step ups  1R x10 L  1R x10 ea 1R x10 ea 1R x10ea 2R x10 ea 2R x12 ea  2R 2x10   Eccentric step downs   0R x10 L  1R x10 ea 1R x10 ea 1R x10 ea 1R x10 ea 1R x10 ea  1R 2x10   Gait Training                                       Modalities             CP         10 min 10 min

## 2021-08-29 ENCOUNTER — OFFICE VISIT (OUTPATIENT)
Dept: URGENT CARE | Age: 51
End: 2021-08-29
Payer: COMMERCIAL

## 2021-08-29 VITALS
OXYGEN SATURATION: 97 % | TEMPERATURE: 98.7 F | WEIGHT: 207 LBS | RESPIRATION RATE: 18 BRPM | BODY MASS INDEX: 36.68 KG/M2 | HEART RATE: 58 BPM | SYSTOLIC BLOOD PRESSURE: 128 MMHG | DIASTOLIC BLOOD PRESSURE: 84 MMHG | HEIGHT: 63 IN

## 2021-08-29 DIAGNOSIS — H11.002 PTERYGIUM OF LEFT EYE: Primary | ICD-10-CM

## 2021-08-29 PROCEDURE — 99213 OFFICE O/P EST LOW 20 MIN: CPT | Performed by: PHYSICIAN ASSISTANT

## 2021-08-29 RX ORDER — TOBRAMYCIN 3 MG/ML
1 SOLUTION/ DROPS OPHTHALMIC
Qty: 1.8 ML | Refills: 0 | Status: SHIPPED | OUTPATIENT
Start: 2021-08-29 | End: 2021-09-05

## 2021-08-29 NOTE — PROGRESS NOTES
[facility]        NAME: Zachary Vizcarra is a 49 y o  female  : 1970    MRN: [id number]  DATE: 2021  TIME: 6:10 PM    Assessment and Plan   Pterygium of left eye [H11 002]  1  Pterygium of left eye  tobramycin (Tobrex) 0 3 % SOLN    Ambulatory referral to Ophthalmology         Patient Instructions       Follow up with PCP in 3-5 days  Proceed to  ER if symptoms worsen  Chief Complaint     Chief Complaint   Patient presents with    Eye Problem     Left Eye Pain/Redness         History of Present Illness       Patient is c/o left eye redness and bump noted  Pt reports symptoms began a few days ago  Pt denies any pain or itching  No drainage or photophobia  Pt reports noticing a small bump on eyeball left of cornea  Review of Systems   Review of Systems   Constitutional: Negative for chills and fever  HENT: Negative for ear pain and sore throat  Eyes: Positive for redness  Negative for visual disturbance  Respiratory: Negative for cough and shortness of breath  Cardiovascular: Negative for chest pain and palpitations  Gastrointestinal: Negative for abdominal pain and vomiting  Genitourinary: Negative for dysuria and hematuria  Musculoskeletal: Negative for arthralgias and back pain  Skin: Negative for color change and rash  Neurological: Negative for seizures and syncope  All other systems reviewed and are negative          Current Medications       Current Outpatient Medications:     Biotin (Biotin 5000) 5 MG CAPS, Take 1 capsule by mouth 2 (two) times a day, Disp: , Rfl:     Calcium Citrate-Vitamin D (CALCIUM CITRATE + D) 250-200 MG-UNIT TABS, Take 1 tablet by mouth daily, Disp: , Rfl:     Cetirizine HCl 10 MG CAPS, Take 10 mg by mouth daily, Disp: , Rfl:     Cholecalciferol (D-3-5) 125 MCG (5000 UT) capsule, Take 1 capsule by mouth 2 (two) times a day, Disp: , Rfl:     Cyanocobalamin (B-12) 1000 MCG TABS, Take 1 capsule by mouth daily, Disp: , Rfl:   ferrous sulfate (IRON SUPPLEMENT) 325 (65 Fe) mg tablet, Take 1 tablet by mouth daily, Disp: , Rfl:     Multiple Vitamin tablet, Take by mouth, Disp: , Rfl:     Omega-3 Fatty Acids (fish oil) 1,000 mg, Take 1 capsule by mouth 2 (two) times a day, Disp: , Rfl:     Turmeric 500 MG CAPS, Take 1 capsule by mouth daily, Disp: , Rfl:     tobramycin (Tobrex) 0 3 % SOLN, Administer 1 drop into the left eye every 4 (four) hours while awake for 7 days, Disp: 1 8 mL, Rfl: 0    Current Allergies     Allergies as of 08/29/2021 - Reviewed 08/29/2021   Allergen Reaction Noted    Sulfa antibiotics  07/12/2012            The following portions of the patient's history were reviewed and updated as appropriate: allergies, current medications, past family history, past medical history, past social history, past surgical history and problem list      History reviewed  No pertinent past medical history  Past Surgical History:   Procedure Laterality Date    BARIATRIC SURGERY  2014    BREAST BIOPSY  1995    HYSTERECTOMY  2018       History reviewed  No pertinent family history  Medications have been verified  Objective   /84   Pulse 58   Temp 98 7 °F (37 1 °C)   Resp 18   Ht 5' 3" (1 6 m)   Wt 93 9 kg (207 lb)   SpO2 97%   BMI 36 67 kg/m²   No LMP recorded  Patient has had a hysterectomy  Physical Exam     Physical Exam  Constitutional:       Appearance: Normal appearance  HENT:      Head: Normocephalic and atraumatic  Nose: Nose normal       Mouth/Throat:      Mouth: Mucous membranes are moist    Eyes:      Extraocular Movements: Extraocular movements intact  Conjunctiva/sclera: Conjunctivae normal       Pupils: Pupils are equal, round, and reactive to light  Cardiovascular:      Rate and Rhythm: Normal rate  Pulmonary:      Effort: Pulmonary effort is normal    Musculoskeletal:         General: Normal range of motion        Cervical back: Normal range of motion and neck supple  Skin:     General: Skin is warm and dry  Capillary Refill: Capillary refill takes less than 2 seconds  Neurological:      General: No focal deficit present  Mental Status: She is alert and oriented to person, place, and time     Psychiatric:         Mood and Affect: Mood normal          Behavior: Behavior normal

## 2022-02-08 ENCOUNTER — TELEPHONE (OUTPATIENT)
Dept: ADMINISTRATIVE | Facility: OTHER | Age: 52
End: 2022-02-08

## 2022-02-08 NOTE — TELEPHONE ENCOUNTER
----- Message from Felicia Alegre sent at 2/7/2022  3:05 PM EST -----  Regarding: Mammo/ [facility]  02/07/22 3:05 PM    Hello, our patient Vick Walker has had Mammogram completed/performed  Please assist in updating the patient chart by pulling the Care Everywhere (CE) document  The date of service is 10/7/2021       Thank you,  Argentina Maynard MA  [facility]

## 2022-02-08 NOTE — TELEPHONE ENCOUNTER
Upon review of the In Basket request we were able to locate, review, and update the patient chart as requested for Mammogram     Any additional questions or concerns should be emailed to the Practice Liaisons via [email or web address] email, please do not reply via In Basket      Thank you  Samara Carter

## 2022-04-06 ENCOUNTER — RA CDI HCC (OUTPATIENT)
Dept: OTHER | Facility: HOSPITAL | Age: 52
End: 2022-04-06

## 2022-04-06 NOTE — PROGRESS NOTES
[facility]  coding opportunities       Chart reviewed, no opportunity found: CHART REVIEWED, NO OPPORTUNITY FOUND        Patients Insurance        Commercial Insurance: [address] [place] [address]

## 2022-04-13 ENCOUNTER — OFFICE VISIT (OUTPATIENT)
Dept: FAMILY MEDICINE CLINIC | Facility: CLINIC | Age: 52
End: 2022-04-13
Payer: COMMERCIAL

## 2022-04-13 VITALS
RESPIRATION RATE: 12 BRPM | BODY MASS INDEX: 38.34 KG/M2 | OXYGEN SATURATION: 99 % | WEIGHT: 216.4 LBS | DIASTOLIC BLOOD PRESSURE: 86 MMHG | SYSTOLIC BLOOD PRESSURE: 120 MMHG | HEART RATE: 80 BPM | HEIGHT: 63 IN

## 2022-04-13 DIAGNOSIS — Z12.11 SCREENING FOR COLORECTAL CANCER: ICD-10-CM

## 2022-04-13 DIAGNOSIS — Z00.00 ANNUAL PHYSICAL EXAM: Primary | ICD-10-CM

## 2022-04-13 DIAGNOSIS — Z12.12 SCREENING FOR COLORECTAL CANCER: ICD-10-CM

## 2022-04-13 DIAGNOSIS — Z12.31 ENCOUNTER FOR SCREENING MAMMOGRAM FOR BREAST CANCER: ICD-10-CM

## 2022-04-13 DIAGNOSIS — N95.1 HOT FLASHES DUE TO MENOPAUSE: ICD-10-CM

## 2022-04-13 DIAGNOSIS — H93.13 TINNITUS OF BOTH EARS: ICD-10-CM

## 2022-04-13 DIAGNOSIS — Z90.3 POSTGASTRECTOMY MALABSORPTION: ICD-10-CM

## 2022-04-13 DIAGNOSIS — R79.0 LOW FERRITIN LEVEL: ICD-10-CM

## 2022-04-13 DIAGNOSIS — K91.2 POSTGASTRECTOMY MALABSORPTION: ICD-10-CM

## 2022-04-13 DIAGNOSIS — E67.0 HYPERVITAMINOSIS A: ICD-10-CM

## 2022-04-13 DIAGNOSIS — Z11.59 NEED FOR HEPATITIS C SCREENING TEST: ICD-10-CM

## 2022-04-13 DIAGNOSIS — Z12.4 SCREENING FOR CERVICAL CANCER: ICD-10-CM

## 2022-04-13 DIAGNOSIS — E66.09 CLASS 2 OBESITY DUE TO EXCESS CALORIES WITH BODY MASS INDEX (BMI) OF 38.0 TO 38.9 IN ADULT, UNSPECIFIED WHETHER SERIOUS COMORBIDITY PRESENT: ICD-10-CM

## 2022-04-13 PROCEDURE — 1036F TOBACCO NON-USER: CPT | Performed by: PHYSICIAN ASSISTANT

## 2022-04-13 PROCEDURE — 3008F BODY MASS INDEX DOCD: CPT | Performed by: PHYSICIAN ASSISTANT

## 2022-04-13 PROCEDURE — 99396 PREV VISIT EST AGE 40-64: CPT | Performed by: PHYSICIAN ASSISTANT

## 2022-04-13 RX ORDER — PAROXETINE 10 MG/1
10 TABLET, FILM COATED ORAL DAILY
Qty: 30 TABLET | Refills: 2 | Status: SHIPPED | OUTPATIENT
Start: 2022-04-13 | End: 2022-06-13 | Stop reason: SDUPTHER

## 2022-04-13 NOTE — PATIENT INSTRUCTIONS

## 2022-04-13 NOTE — PROGRESS NOTES
[facility]    NAME: Janet Ramos  AGE: 49 y o  SEX: female  : 1970     DATE: 2022     Assessment and Plan:     Problem List Items Addressed This Visit        Digestive    Postgastrectomy malabsorption     Check labs as ordered  Relevant Orders    Ambulatory Referral to Weight Management    CBC and differential    Comprehensive metabolic panel    Folate    Ferritin    Hemoglobin A1C    Iron    Lipid Panel with Direct LDL reflex    Magnesium    PTH, intact    TIBC    TSH, 3rd generation with Free T4 reflex    Vitamin A    Vitamin B1, whole blood    Vitamin B12    Vitamin D 25 hydroxy       Cardiovascular and Mediastinum    Hot flashes due to menopause     Patient will start Paxil 10 mg daily  Follow-up in 2 months  Call with any problems or concerns in the interim  Relevant Medications    PARoxetine (PAXIL) 10 mg tablet       Other    Hypervitaminosis A     Recheck with labs as ordered  Relevant Orders    Vitamin A    Low ferritin level     Currently on iron once daily  Recheck level with labs as ordered  Relevant Orders    Ferritin    Iron    TIBC    Obesity     Discussed with patient stepped that she could make to improve her lifestyle choices  We discussed trying to replace her evening snacking with exercise or at lease having healthier snacks in place for that time a day  She was encouraged to start logging her food intake on an ruba such as my fitness pal or lose it  She was additionally referred to weight management  BMI Counseling: Body mass index is 38 33 kg/m²  The BMI is above normal  Nutrition recommendations include decreasing overall calorie intake and consuming healthier snacks  Exercise recommendations include moderate aerobic physical activity for 150 minutes/week and exercising 3-5 times per week   Patient referred to weight management due to patient being severely obese  Relevant Orders    Ambulatory Referral to Weight Management    CBC and differential    Comprehensive metabolic panel    Folate    Ferritin    Hemoglobin A1C    Iron    Lipid Panel with Direct LDL reflex    Magnesium    PTH, intact    TIBC    TSH, 3rd generation with Free T4 reflex    Vitamin A    Vitamin B1, whole blood    Vitamin B12    Vitamin D 25 hydroxy    Tinnitus of both ears     Could try lipoflavanoid over-the-counter  Referred to ENT  Relevant Orders    Ambulatory Referral to Otolaryngology      Other Visit Diagnoses     Annual physical exam    -  Primary    Encounter for screening mammogram for breast cancer        Relevant Orders    Mammo screening bilateral w 3d & cad    Screening for cervical cancer        Relevant Orders    Ambulatory referral to Obstetrics / Gynecology    Screening for colorectal cancer        Relevant Orders    Ambulatory referral to Gastroenterology    Need for hepatitis C screening test        Relevant Orders    Hepatitis C antibody          Immunizations and preventive care screenings were discussed with patient today  Appropriate education was printed on patient's after visit summary  Counseling:  · Exercise: the importance of regular exercise/physical activity was discussed  Recommend exercise 3-5 times per week for at least 30 minutes  Return in about 2 months (around 6/13/2022) for Recheck  Chief Complaint:     Chief Complaint   Patient presents with    Physical Exam      History of Present Illness:     Adult Annual Physical   Patient here for a comprehensive physical exam  The patient reports problems - as below      Concern about her weight - s/p bariatric surgery in 2014 but weight is increasing and wants assistance    Reports hx of severe ear infection and notes that she gets tinnitus - see chiropractor and the maneuver helps some - notes it is bothering her more often than not        Diet and Physical Activity  · Diet/Nutrition: frequent junk food  · Exercise: no formal exercise  Depression Screening  PHQ-2/9 Depression Screening    Little interest or pleasure in doing things: 0 - not at all  Feeling down, depressed, or hopeless: 0 - not at all       General Health  · Sleep: gets 7-8 hours of sleep on average  50% good nights  · Hearing: normal - bilateral  But has tinnitus  · Vision: goes for regular eye exams  · Dental: regular dental visits  /GYN Health  · Patient is: perimenopausal  · Last menstrual period: hysterectomy in 2018        Review of Systems:     Review of Systems   Constitutional: Negative for chills and fever  HENT: Negative for congestion, rhinorrhea and sore throat  Eyes: Negative for visual disturbance  Respiratory: Negative for cough, shortness of breath and wheezing  Cardiovascular: Negative for chest pain, palpitations and leg swelling  Gastrointestinal: Negative for abdominal pain, blood in stool, constipation, diarrhea, nausea and vomiting  Endocrine: Negative for polydipsia and polyuria  Genitourinary: Negative for dysuria and frequency  Musculoskeletal: Negative for arthralgias and myalgias  Skin: Negative for rash  Neurological: Negative for dizziness, syncope, light-headedness and headaches  Hematological: Does not bruise/bleed easily  Psychiatric/Behavioral: Positive for sleep disturbance  Negative for dysphoric mood  The patient is not nervous/anxious  Past Medical History:     History reviewed  No pertinent past medical history     Past Surgical History:     Past Surgical History:   Procedure Laterality Date    BARIATRIC SURGERY  2014    BREAST BIOPSY  1995    HYSTERECTOMY  2018      Social History:     Social History     Socioeconomic History    Marital status:      Spouse name: None    Number of children: None    Years of education: None    Highest education level: None   Occupational History    None   Tobacco Use  Smoking status: Never Smoker    Smokeless tobacco: Never Used   Vaping Use    Vaping Use: Never used   Substance and Sexual Activity    Alcohol use: Yes     Comment: 1 glass of red wine nightly    Drug use: Never    Sexual activity: None   Other Topics Concern    None   Social History Narrative    None     Social Determinants of Health     Financial Resource Strain: Not on file   Food Insecurity: Not on file   Transportation Needs: Not on file   Physical Activity: Not on file   Stress: Not on file   Social Connections: Not on file   Intimate Partner Violence: Not on file   Housing Stability: Not on file      Family History:     Family History   Problem Relation Age of Onset    Diabetes Mother     Coronary artery disease Father       Current Medications:     Current Outpatient Medications   Medication Sig Dispense Refill    Biotin (Biotin 5000) 5 MG CAPS Take 1 capsule by mouth 2 (two) times a day      Calcium Citrate-Vitamin D (CALCIUM CITRATE + D) 250-200 MG-UNIT TABS Take 1 tablet by mouth daily      Cetirizine HCl 10 MG CAPS Take 10 mg by mouth daily      Cholecalciferol (D-3-5) 125 MCG (5000 UT) capsule Take 1 capsule by mouth 2 (two) times a day      Cyanocobalamin (B-12) 1000 MCG TABS Take 1 capsule by mouth daily      ferrous sulfate (IRON SUPPLEMENT) 325 (65 Fe) mg tablet Take 1 tablet by mouth daily      Multiple Vitamin tablet Take by mouth      Omega-3 Fatty Acids (fish oil) 1,000 mg Take 1 capsule by mouth 2 (two) times a day      Turmeric 500 MG CAPS Take 1 capsule by mouth daily      PARoxetine (PAXIL) 10 mg tablet Take 1 tablet (10 mg total) by mouth daily 30 tablet 2     No current facility-administered medications for this visit  Allergies:      Allergies   Allergen Reactions    Sulfa Antibiotics       Physical Exam:     /86 (BP Location: Left arm, Patient Position: Sitting, Cuff Size: Standard)   Pulse 80   Resp 12   Ht 5' 3" (1 6 m)   Wt 98 2 kg (216 lb 6 4 oz)   SpO2 99%   BMI 38 33 kg/m²     Physical Exam  Vitals reviewed  Constitutional:       General: She is not in acute distress  Appearance: Normal appearance  She is well-developed  She is obese  She is not ill-appearing  HENT:      Head: Normocephalic and atraumatic  Right Ear: External ear normal       Left Ear: External ear normal       Nose: Nose normal       Mouth/Throat:      Mouth: Mucous membranes are moist       Pharynx: No oropharyngeal exudate  Eyes:      Conjunctiva/sclera: Conjunctivae normal       Pupils: Pupils are equal, round, and reactive to light  Neck:      Thyroid: No thyromegaly  Vascular: No carotid bruit  Cardiovascular:      Rate and Rhythm: Normal rate and regular rhythm  Pulses: Normal pulses  Heart sounds: Normal heart sounds  No murmur heard  Pulmonary:      Effort: Pulmonary effort is normal       Breath sounds: Normal breath sounds  No wheezing, rhonchi or rales  Abdominal:      General: Bowel sounds are normal  There is no distension  Palpations: Abdomen is soft  There is no mass  Tenderness: There is no abdominal tenderness  Musculoskeletal:      Cervical back: Normal range of motion and neck supple  Right lower leg: No edema  Left lower leg: No edema  Lymphadenopathy:      Cervical: No cervical adenopathy  Skin:     General: Skin is warm and dry  Findings: No rash  Neurological:      Mental Status: She is alert  Sensory: No sensory deficit  Comments: 5/5 strength in UE and LE   Psychiatric:         Mood and Affect: Mood normal          Behavior: Behavior normal          Thought Content:  Thought content normal          Judgment: Judgment normal           Igor Caicedo PA-C  [facility]

## 2022-04-16 PROBLEM — N95.1 HOT FLASHES DUE TO MENOPAUSE: Status: ACTIVE | Noted: 2022-04-16

## 2022-04-16 PROBLEM — H93.13 TINNITUS OF BOTH EARS: Status: ACTIVE | Noted: 2022-04-16

## 2022-04-16 NOTE — ASSESSMENT & PLAN NOTE
Discussed with patient stepped that she could make to improve her lifestyle choices  We discussed trying to replace her evening snacking with exercise or at lease having healthier snacks in place for that time a day  She was encouraged to start logging her food intake on an ruba such as my fitness pal or lose it  She was additionally referred to weight management  BMI Counseling: Body mass index is 38 33 kg/m²  The BMI is above normal  Nutrition recommendations include decreasing overall calorie intake and consuming healthier snacks  Exercise recommendations include moderate aerobic physical activity for 150 minutes/week and exercising 3-5 times per week  Patient referred to weight management due to patient being severely obese

## 2022-04-16 NOTE — ASSESSMENT & PLAN NOTE
Patient will start Paxil 10 mg daily  Follow-up in 2 months  Call with any problems or concerns in the interim

## 2022-04-18 ENCOUNTER — APPOINTMENT (OUTPATIENT)
Dept: LAB | Age: 52
End: 2022-04-18
Payer: COMMERCIAL

## 2022-04-18 DIAGNOSIS — Z90.3 POSTGASTRECTOMY MALABSORPTION: ICD-10-CM

## 2022-04-18 DIAGNOSIS — E66.09 CLASS 2 OBESITY DUE TO EXCESS CALORIES WITH BODY MASS INDEX (BMI) OF 38.0 TO 38.9 IN ADULT, UNSPECIFIED WHETHER SERIOUS COMORBIDITY PRESENT: ICD-10-CM

## 2022-04-18 DIAGNOSIS — K91.2 POSTGASTRECTOMY MALABSORPTION: ICD-10-CM

## 2022-04-18 DIAGNOSIS — Z11.59 NEED FOR HEPATITIS C SCREENING TEST: ICD-10-CM

## 2022-04-18 DIAGNOSIS — E67.0 HYPERVITAMINOSIS A: ICD-10-CM

## 2022-04-18 DIAGNOSIS — R79.0 LOW FERRITIN LEVEL: ICD-10-CM

## 2022-04-18 LAB
25(OH)D3 SERPL-MCNC: 96.6 NG/ML (ref 30–100)
ALBUMIN SERPL BCP-MCNC: 3.7 G/DL (ref 3.5–5)
ALP SERPL-CCNC: 59 U/L (ref 46–116)
ALT SERPL W P-5'-P-CCNC: 24 U/L (ref 12–78)
ANION GAP SERPL CALCULATED.3IONS-SCNC: 6 MMOL/L (ref 4–13)
AST SERPL W P-5'-P-CCNC: 18 U/L (ref 5–45)
BASOPHILS # BLD AUTO: 0.03 THOUSANDS/ΜL (ref 0–0.1)
BASOPHILS NFR BLD AUTO: 1 % (ref 0–1)
BILIRUB SERPL-MCNC: 0.58 MG/DL (ref 0.2–1)
BUN SERPL-MCNC: 12 MG/DL (ref 5–25)
CALCIUM SERPL-MCNC: 9.6 MG/DL (ref 8.3–10.1)
CHLORIDE SERPL-SCNC: 104 MMOL/L (ref 100–108)
CHOLEST SERPL-MCNC: 238 MG/DL
CO2 SERPL-SCNC: 27 MMOL/L (ref 21–32)
CREAT SERPL-MCNC: 0.88 MG/DL (ref 0.6–1.3)
EOSINOPHIL # BLD AUTO: 0.28 THOUSAND/ΜL (ref 0–0.61)
EOSINOPHIL NFR BLD AUTO: 7 % (ref 0–6)
ERYTHROCYTE [DISTWIDTH] IN BLOOD BY AUTOMATED COUNT: 12.8 % (ref 11.6–15.1)
EST. AVERAGE GLUCOSE BLD GHB EST-MCNC: 108 MG/DL
FERRITIN SERPL-MCNC: 130 NG/ML (ref 8–388)
FOLATE SERPL-MCNC: >20 NG/ML (ref 3.1–17.5)
GFR SERPL CREATININE-BSD FRML MDRD: 76 ML/MIN/1.73SQ M
GLUCOSE P FAST SERPL-MCNC: 95 MG/DL (ref 65–99)
HBA1C MFR BLD: 5.4 %
HCT VFR BLD AUTO: 41.2 % (ref 34.8–46.1)
HCV AB SER QL: NORMAL
HDLC SERPL-MCNC: 101 MG/DL
HGB BLD-MCNC: 13 G/DL (ref 11.5–15.4)
IMM GRANULOCYTES # BLD AUTO: 0.01 THOUSAND/UL (ref 0–0.2)
IMM GRANULOCYTES NFR BLD AUTO: 0 % (ref 0–2)
IRON SERPL-MCNC: 123 UG/DL (ref 50–170)
LDLC SERPL CALC-MCNC: 125 MG/DL (ref 0–100)
LYMPHOCYTES # BLD AUTO: 1.41 THOUSANDS/ΜL (ref 0.6–4.47)
LYMPHOCYTES NFR BLD AUTO: 35 % (ref 14–44)
MAGNESIUM SERPL-MCNC: 2.1 MG/DL (ref 1.6–2.6)
MCH RBC QN AUTO: 30.4 PG (ref 26.8–34.3)
MCHC RBC AUTO-ENTMCNC: 31.6 G/DL (ref 31.4–37.4)
MCV RBC AUTO: 96 FL (ref 82–98)
MONOCYTES # BLD AUTO: 0.32 THOUSAND/ΜL (ref 0.17–1.22)
MONOCYTES NFR BLD AUTO: 8 % (ref 4–12)
NEUTROPHILS # BLD AUTO: 2 THOUSANDS/ΜL (ref 1.85–7.62)
NEUTS SEG NFR BLD AUTO: 49 % (ref 43–75)
NRBC BLD AUTO-RTO: 0 /100 WBCS
PLATELET # BLD AUTO: 286 THOUSANDS/UL (ref 149–390)
PMV BLD AUTO: 10.9 FL (ref 8.9–12.7)
POTASSIUM SERPL-SCNC: 4.1 MMOL/L (ref 3.5–5.3)
PROT SERPL-MCNC: 7.3 G/DL (ref 6.4–8.2)
PTH-INTACT SERPL-MCNC: 53.8 PG/ML (ref 18.4–80.1)
RBC # BLD AUTO: 4.28 MILLION/UL (ref 3.81–5.12)
SODIUM SERPL-SCNC: 137 MMOL/L (ref 136–145)
TIBC SERPL-MCNC: 320 UG/DL (ref 250–450)
TRIGL SERPL-MCNC: 62 MG/DL
TSH SERPL DL<=0.05 MIU/L-ACNC: 1.07 UIU/ML (ref 0.45–4.5)
VIT B12 SERPL-MCNC: 1098 PG/ML (ref 100–900)
WBC # BLD AUTO: 4.05 THOUSAND/UL (ref 4.31–10.16)

## 2022-04-18 PROCEDURE — 83036 HEMOGLOBIN GLYCOSYLATED A1C: CPT

## 2022-04-18 PROCEDURE — 85025 COMPLETE CBC W/AUTO DIFF WBC: CPT

## 2022-04-18 PROCEDURE — 83970 ASSAY OF PARATHORMONE: CPT

## 2022-04-18 PROCEDURE — 36415 COLL VENOUS BLD VENIPUNCTURE: CPT

## 2022-04-18 PROCEDURE — 82746 ASSAY OF FOLIC ACID SERUM: CPT

## 2022-04-18 PROCEDURE — 82306 VITAMIN D 25 HYDROXY: CPT

## 2022-04-18 PROCEDURE — 84443 ASSAY THYROID STIM HORMONE: CPT

## 2022-04-18 PROCEDURE — 80061 LIPID PANEL: CPT

## 2022-04-18 PROCEDURE — 86803 HEPATITIS C AB TEST: CPT

## 2022-04-18 PROCEDURE — 80053 COMPREHEN METABOLIC PANEL: CPT

## 2022-04-18 PROCEDURE — 83735 ASSAY OF MAGNESIUM: CPT

## 2022-04-18 PROCEDURE — 82607 VITAMIN B-12: CPT

## 2022-04-18 PROCEDURE — 83550 IRON BINDING TEST: CPT

## 2022-04-18 PROCEDURE — 84590 ASSAY OF VITAMIN A: CPT

## 2022-04-18 PROCEDURE — 84425 ASSAY OF VITAMIN B-1: CPT

## 2022-04-18 PROCEDURE — 83540 ASSAY OF IRON: CPT

## 2022-04-18 PROCEDURE — 82728 ASSAY OF FERRITIN: CPT

## 2022-04-28 LAB — VIT A SERPL-MCNC: 57.1 UG/DL (ref 20.1–62)

## 2022-05-01 LAB — VIT B1 BLD-SCNC: 159.2 NMOL/L (ref 66.5–200)

## 2022-05-26 ENCOUNTER — OFFICE VISIT (OUTPATIENT)
Dept: GASTROENTEROLOGY | Facility: MEDICAL CENTER | Age: 52
End: 2022-05-26
Payer: COMMERCIAL

## 2022-05-26 VITALS — BODY MASS INDEX: 37.84 KG/M2 | WEIGHT: 213.6 LBS | HEART RATE: 61 BPM | TEMPERATURE: 98.4 F

## 2022-05-26 DIAGNOSIS — E61.1 IRON DEFICIENCY: ICD-10-CM

## 2022-05-26 DIAGNOSIS — Z12.11 COLON CANCER SCREENING: Primary | ICD-10-CM

## 2022-05-26 PROCEDURE — 99243 OFF/OP CNSLTJ NEW/EST LOW 30: CPT | Performed by: INTERNAL MEDICINE

## 2022-05-26 PROCEDURE — 1036F TOBACCO NON-USER: CPT | Performed by: INTERNAL MEDICINE

## 2022-05-26 NOTE — PATIENT INSTRUCTIONS
Scheduled date of colon/egd (as of today) 08/19/22  Physician performing: Tyrone Case  Location of procedure:  [place]  Bowel prep reviewed with patient: Miralax/Dulcolax  Instructions reviewed with patient by: MA  Clearances: NA    Iron hold 5 days

## 2022-05-26 NOTE — PROGRESS NOTES
[facility] - Outpatient Consultation  Nain Gupta 47 y o  female MRN: [id number]  Encounter: [id number]    HPI:    Nain Gupta is a 47 y o  female who presents to discuss for screening colonoscopy  No FH of colon cancer  No bleeding  No anticoagulation  She has history of gastric sleeve in 2014  No GERD or dysphagia currently  She also has history of iron deficiency and is on oral iron  Her last EGD was in 2014 prior to her surgery  She had hysterectomy in 2018  Currently, her iron levels are normal this is possible due to the fact that she is taking iron supplements every day  No FH of upper GI cancer  She does not smoke  REVIEW OF SYSTEMS:  CONSTITUTIONAL: Denies any fever, chills, rigors, and weight loss  HEENT: No earache or tinnitus  Denies hearing loss or visual disturbances  CARDIOVASCULAR: No chest pain or palpitations  RESPIRATORY: Denies any cough, hemoptysis, shortness of breath or dyspnea on exertion  GASTROINTESTINAL: As noted in the History of Present Illness  GENITOURINARY: No problems with urination  Denies any hematuria or dysuria  NEUROLOGIC: No dizziness or vertigo, denies headaches  MUSCULOSKELETAL: Denies any muscle or joint pain  SKIN: Denies skin rashes or itching  ENDOCRINE: Denies excessive thirst  Denies intolerance to heat or cold  PSYCHOSOCIAL: Denies depression or anxiety  Denies any recent memory loss  Historical Information   History reviewed  No pertinent past medical history    Past Surgical History:   Procedure Laterality Date    BARIATRIC SURGERY  2014    BREAST BIOPSY  1995    HYSTERECTOMY  2018     Social History   Social History     Substance and Sexual Activity   Alcohol Use Yes    Comment: 1 glass of red wine nightly     Social History     Substance and Sexual Activity   Drug Use Never     Social History     Tobacco Use   Smoking Status Never Smoker   Smokeless Tobacco Never Used     Family History   Problem Relation Age of Onset    Diabetes Mother     Coronary artery disease Father        Meds/Allergies       Current Outpatient Medications:     Biotin 5 MG CAPS    Calcium Citrate-Vitamin D 250-200 MG-UNIT TABS    Cetirizine HCl 10 MG CAPS    Cholecalciferol (D-3-5) 125 MCG (5000 UT) capsule    Cyanocobalamin (B-12) 1000 MCG TABS    ferrous sulfate 325 (65 Fe) mg tablet    Multiple Vitamin tablet    Omega-3 Fatty Acids (fish oil) 1,000 mg    PARoxetine (PAXIL) 10 mg tablet    Turmeric 500 MG CAPS    Allergies   Allergen Reactions    Sulfa Antibiotics        Objective   Pulse 61, temperature 98 4 °F (36 9 °C), weight 96 9 kg (213 lb 9 6 oz)  Body mass index is 37 84 kg/m²  PHYSICAL EXAM:    General Appearance:   Alert, cooperative, no distress   HEENT:   Normocephalic, atraumatic, anicteric  Neck:  Supple, symmetrical, trachea midline   Lungs:   Clear to auscultation bilaterally; no rales, rhonchi or wheezing; respirations unlabored    Heart[de-identified]   Regular rate and rhythm; no murmur, rub, or gallop  Abdomen:   Soft, non-tender, non-distended; normal bowel sounds; no masses, no organomegaly    Genitalia:   Deferred    Rectal:   Deferred    Extremities:  No cyanosis, clubbing or edema    Pulses:  2+ and symmetric    Skin:  No jaundice, rashes, or lesions    Lymph nodes:  No palpable cervical lymphadenopathy        Lab Results:   No visits with results within 1 Day(s) from this visit     Latest known visit with results is:   Appointment on 04/18/2022   Component Date Value    WBC 04/18/2022 4 05 (A)    RBC 04/18/2022 4 28     Hemoglobin 04/18/2022 13 0     Hematocrit 04/18/2022 41 2     MCV 04/18/2022 96     MCH 04/18/2022 30 4     MCHC 04/18/2022 31 6     RDW 04/18/2022 12 8     MPV 04/18/2022 10 9     Platelets 19/76/2002 286     nRBC 04/18/2022 0     Neutrophils Relative 04/18/2022 49     Immat GRANS % 04/18/2022 0     Lymphocytes Relative 04/18/2022 35     Monocytes Relative 04/18/2022 8     Eosinophils Relative 04/18/2022 7 (A)    Basophils Relative 04/18/2022 1     Neutrophils Absolute 04/18/2022 2 00     Immature Grans Absolute 04/18/2022 0 01     Lymphocytes Absolute 04/18/2022 1 41     Monocytes Absolute 04/18/2022 0 32     Eosinophils Absolute 04/18/2022 0 28     Basophils Absolute 04/18/2022 0 03     Sodium 04/18/2022 137     Potassium 04/18/2022 4 1     Chloride 04/18/2022 104     CO2 04/18/2022 27     ANION GAP 04/18/2022 6     BUN 04/18/2022 12     Creatinine 04/18/2022 0 88     Glucose, Fasting 04/18/2022 95     Calcium 04/18/2022 9 6     AST 04/18/2022 18     ALT 04/18/2022 24     Alkaline Phosphatase 04/18/2022 59     Total Protein 04/18/2022 7 3     Albumin 04/18/2022 3 7     Total Bilirubin 04/18/2022 0 58     eGFR 04/18/2022 76     Folate 04/18/2022 >20 0 (A)    Ferritin 04/18/2022 130     Hemoglobin A1C 04/18/2022 5 4     EAG 04/18/2022 108     Iron 04/18/2022 123     Cholesterol 04/18/2022 238 (A)    Triglycerides 04/18/2022 62     HDL, Direct 04/18/2022 101     LDL Calculated 04/18/2022 125 (A)    Magnesium 04/18/2022 2 1     PTH 04/18/2022 53 8     TIBC 04/18/2022 320     TSH 3RD GENERATON 04/18/2022 1 070     Vitamin A 04/18/2022 57 1     Vitamin B1, Whole Blood 04/18/2022 159 2     Vitamin B-12 04/18/2022 1,098 (A)    Vit D, 25-Hydroxy 04/18/2022 96 6     Hepatitis C Ab 04/18/2022 Non-reactive        Lab Results   Component Value Date    WBC 4 05 (L) 04/18/2022    HGB 13 0 04/18/2022    HCT 41 2 04/18/2022    MCV 96 04/18/2022     04/18/2022       Lab Results   Component Value Date     10/01/2015    SODIUM 137 04/18/2022    K 4 1 04/18/2022     04/18/2022    CO2 27 04/18/2022    ANIONGAP 6 10/01/2015    AGAP 6 04/18/2022    BUN 12 04/18/2022    CREATININE 0 88 04/18/2022    GLUC 86 07/27/2016    GLUF 95 04/18/2022    CALCIUM 9 6 04/18/2022    AST 18 04/18/2022    ALT 24 04/18/2022    ALKPHOS 59 04/18/2022    PROT 7 3 10/01/2015    TP 7 3 04/18/2022    BILITOT 0 40 10/01/2015    TBILI 0 58 04/18/2022    EGFR 76 04/18/2022       No results found for: CRP    Lab Results   Component Value Date    [id number] 1 070 04/18/2022       Lab Results   Component Value Date    IRON 123 04/18/2022    TIBC 320 04/18/2022    FERRITIN 130 04/18/2022       Radiology Results:   No results found  ______________________________________________________________________  ASSESSMENT AND PLAN:    Amy Lopes is a 47 y o  female who presents to schedule for screening colonoscopy  She is at average risk for colon cancer  In addition, she has longstanding history of iron deficiency  While she is not iron deficient currently, she is requiring iron supplements  I reviewed her labs  She was iron deficient in 2016, which was 2 years after her last EGD  She has been on oral iron since  Therefore, we are going to schedule both EGD and colonoscopy at this time for workup of iron deficiency  I discussed the risks of bleeding, infection, and perforation associated with endoscopic procedures  1  Colon cancer screening    2   Iron deficiency        Orders Placed This Encounter   Procedures    Colonoscopy    EGD

## 2022-06-08 ENCOUNTER — VBI (OUTPATIENT)
Dept: ADMINISTRATIVE | Facility: OTHER | Age: 52
End: 2022-06-08
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
## 2022-06-13 ENCOUNTER — OFFICE VISIT (OUTPATIENT)
Dept: FAMILY MEDICINE CLINIC | Facility: CLINIC | Age: 52
End: 2022-06-13
Payer: COMMERCIAL

## 2022-06-13 VITALS
DIASTOLIC BLOOD PRESSURE: 78 MMHG | OXYGEN SATURATION: 98 % | HEIGHT: 63 IN | BODY MASS INDEX: 38.13 KG/M2 | WEIGHT: 215.2 LBS | HEART RATE: 65 BPM | SYSTOLIC BLOOD PRESSURE: 100 MMHG

## 2022-06-13 DIAGNOSIS — N95.1 HOT FLASHES DUE TO MENOPAUSE: Primary | ICD-10-CM

## 2022-06-13 DIAGNOSIS — E66.09 CLASS 2 OBESITY DUE TO EXCESS CALORIES WITH BODY MASS INDEX (BMI) OF 38.0 TO 38.9 IN ADULT, UNSPECIFIED WHETHER SERIOUS COMORBIDITY PRESENT: ICD-10-CM

## 2022-06-13 PROCEDURE — 99213 OFFICE O/P EST LOW 20 MIN: CPT | Performed by: PHYSICIAN ASSISTANT

## 2022-06-13 PROCEDURE — 1036F TOBACCO NON-USER: CPT | Performed by: PHYSICIAN ASSISTANT

## 2022-06-13 PROCEDURE — 3725F SCREEN DEPRESSION PERFORMED: CPT | Performed by: PHYSICIAN ASSISTANT

## 2022-06-13 PROCEDURE — 3008F BODY MASS INDEX DOCD: CPT | Performed by: PHYSICIAN ASSISTANT

## 2022-06-13 RX ORDER — PAROXETINE 10 MG/1
10 TABLET, FILM COATED ORAL DAILY
Qty: 30 TABLET | Refills: 2 | Status: SHIPPED | OUTPATIENT
Start: 2022-06-13

## 2022-06-14 NOTE — ASSESSMENT & PLAN NOTE
Weight has been stable overall recently  She was encouraged to exercise regularly and to be cautious with her diet  She was encouraged to start using apps to track her intake and exercise  Will continue to monitor  BMI Counseling: Body mass index is 38 12 kg/m²  The BMI is above normal  Nutrition recommendations include 3-5 servings of fruits/vegetables daily and consuming healthier snacks  Exercise recommendations include moderate aerobic physical activity for 150 minutes/week and exercising 3-5 times per week

## 2022-07-14 ENCOUNTER — TELEPHONE (OUTPATIENT)
Dept: GASTROENTEROLOGY | Facility: MEDICAL CENTER | Age: 52
End: 2022-07-14

## 2022-07-14 NOTE — TELEPHONE ENCOUNTER
Please check if patient is still taking Iron   If so is it prescribed and by whom? Then we need to send out a clearance to hold to the provider

## 2022-07-15 ENCOUNTER — TELEPHONE (OUTPATIENT)
Dept: GASTROENTEROLOGY | Facility: MEDICAL CENTER | Age: 52
End: 2022-07-15

## 2022-07-15 NOTE — TELEPHONE ENCOUNTER
Pt called to let us know her iron is over the counter   Advised pt to hold iron 5 days prior to procedure

## 2022-08-09 ENCOUNTER — TELEPHONE (OUTPATIENT)
Dept: GASTROENTEROLOGY | Facility: MEDICAL CENTER | Age: 52
End: 2022-08-09

## 2022-08-18 RX ORDER — ONDANSETRON 2 MG/ML
4 INJECTION INTRAMUSCULAR; INTRAVENOUS ONCE AS NEEDED
Status: CANCELLED | OUTPATIENT
Start: 2022-08-18

## 2022-08-18 RX ORDER — ALBUTEROL SULFATE 2.5 MG/3ML
2.5 SOLUTION RESPIRATORY (INHALATION) ONCE AS NEEDED
Status: CANCELLED | OUTPATIENT
Start: 2022-08-18

## 2022-08-18 RX ORDER — SODIUM CHLORIDE 9 MG/ML
125 INJECTION, SOLUTION INTRAVENOUS CONTINUOUS
Status: CANCELLED | OUTPATIENT
Start: 2022-08-18

## 2022-08-18 RX ORDER — PROMETHAZINE HYDROCHLORIDE 25 MG/ML
12.5 INJECTION, SOLUTION INTRAMUSCULAR; INTRAVENOUS ONCE AS NEEDED
Status: CANCELLED | OUTPATIENT
Start: 2022-08-18

## 2022-08-19 ENCOUNTER — ANESTHESIA (OUTPATIENT)
Dept: GASTROENTEROLOGY | Facility: MEDICAL CENTER | Age: 52
End: 2022-08-19

## 2022-08-19 ENCOUNTER — HOSPITAL ENCOUNTER (OUTPATIENT)
Dept: GASTROENTEROLOGY | Facility: MEDICAL CENTER | Age: 52
Setting detail: OUTPATIENT SURGERY
Discharge: HOME | End: 2022-08-19
Payer: COMMERCIAL

## 2022-08-19 ENCOUNTER — ANESTHESIA EVENT (OUTPATIENT)
Dept: GASTROENTEROLOGY | Facility: MEDICAL CENTER | Age: 52
End: 2022-08-19

## 2022-08-19 VITALS
DIASTOLIC BLOOD PRESSURE: 76 MMHG | TEMPERATURE: 97.1 F | OXYGEN SATURATION: 98 % | WEIGHT: 215 LBS | HEART RATE: 68 BPM | SYSTOLIC BLOOD PRESSURE: 121 MMHG | HEIGHT: 63 IN | RESPIRATION RATE: 20 BRPM | BODY MASS INDEX: 38.09 KG/M2

## 2022-08-19 DIAGNOSIS — K21.9 GASTROESOPHAGEAL REFLUX DISEASE WITHOUT ESOPHAGITIS: Primary | ICD-10-CM

## 2022-08-19 DIAGNOSIS — E61.1 IRON DEFICIENCY: ICD-10-CM

## 2022-08-19 DIAGNOSIS — Z12.11 COLON CANCER SCREENING: ICD-10-CM

## 2022-08-19 PROCEDURE — 88305 TISSUE EXAM BY PATHOLOGIST: CPT | Performed by: PATHOLOGY

## 2022-08-19 PROCEDURE — 43239 EGD BIOPSY SINGLE/MULTIPLE: CPT | Performed by: INTERNAL MEDICINE

## 2022-08-19 PROCEDURE — G0121 COLON CA SCRN NOT HI RSK IND: HCPCS | Performed by: INTERNAL MEDICINE

## 2022-08-19 RX ORDER — PROPOFOL 10 MG/ML
INJECTION, EMULSION INTRAVENOUS AS NEEDED
Status: DISCONTINUED | OUTPATIENT
Start: 2022-08-19 | End: 2022-08-19

## 2022-08-19 RX ORDER — ONDANSETRON 2 MG/ML
4 INJECTION INTRAMUSCULAR; INTRAVENOUS ONCE AS NEEDED
Status: DISCONTINUED | OUTPATIENT
Start: 2022-08-19 | End: 2022-08-23 | Stop reason: HOSPADM

## 2022-08-19 RX ORDER — SODIUM CHLORIDE 9 MG/ML
125 INJECTION, SOLUTION INTRAVENOUS CONTINUOUS
Status: DISCONTINUED | OUTPATIENT
Start: 2022-08-19 | End: 2022-08-23 | Stop reason: HOSPADM

## 2022-08-19 RX ORDER — ALBUTEROL SULFATE 2.5 MG/3ML
2.5 SOLUTION RESPIRATORY (INHALATION) ONCE AS NEEDED
Status: DISCONTINUED | OUTPATIENT
Start: 2022-08-19 | End: 2022-08-23 | Stop reason: HOSPADM

## 2022-08-19 RX ORDER — SIMETHICONE 20 MG/.3ML
EMULSION ORAL
Status: DISCONTINUED
Start: 2022-08-19 | End: 2022-08-23 | Stop reason: HOSPADM

## 2022-08-19 RX ORDER — LIDOCAINE HYDROCHLORIDE 20 MG/ML
INJECTION, SOLUTION EPIDURAL; INFILTRATION; INTRACAUDAL; PERINEURAL AS NEEDED
Status: DISCONTINUED | OUTPATIENT
Start: 2022-08-19 | End: 2022-08-19

## 2022-08-19 RX ORDER — OMEPRAZOLE 20 MG/1
20 CAPSULE, DELAYED RELEASE ORAL DAILY
Qty: 90 CAPSULE | Refills: 3 | Status: SHIPPED | OUTPATIENT
Start: 2022-08-19

## 2022-08-19 RX ADMIN — PROPOFOL 50 MG: 10 INJECTION, EMULSION INTRAVENOUS at 13:55

## 2022-08-19 RX ADMIN — PROPOFOL 50 MG: 10 INJECTION, EMULSION INTRAVENOUS at 13:43

## 2022-08-19 RX ADMIN — SODIUM CHLORIDE 125 ML/HR: 0.9 INJECTION, SOLUTION INTRAVENOUS at 13:28

## 2022-08-19 RX ADMIN — PROPOFOL 50 MG: 10 INJECTION, EMULSION INTRAVENOUS at 13:47

## 2022-08-19 RX ADMIN — LIDOCAINE HYDROCHLORIDE 100 MG: 20 INJECTION, SOLUTION EPIDURAL; INFILTRATION; INTRACAUDAL at 13:40

## 2022-08-19 RX ADMIN — PROPOFOL 50 MG: 10 INJECTION, EMULSION INTRAVENOUS at 13:45

## 2022-08-19 RX ADMIN — PROPOFOL 50 MG: 10 INJECTION, EMULSION INTRAVENOUS at 13:41

## 2022-08-19 RX ADMIN — PROPOFOL 100 MG: 10 INJECTION, EMULSION INTRAVENOUS at 13:40

## 2022-08-19 RX ADMIN — Medication 40 MG: at 13:57

## 2022-08-19 RX ADMIN — PROPOFOL 50 MG: 10 INJECTION, EMULSION INTRAVENOUS at 14:02

## 2022-08-19 RX ADMIN — PROPOFOL 50 MG: 10 INJECTION, EMULSION INTRAVENOUS at 13:58

## 2022-08-19 RX ADMIN — PROPOFOL 50 MG: 10 INJECTION, EMULSION INTRAVENOUS at 13:52

## 2022-08-19 NOTE — ANESTHESIA POSTPROCEDURE EVALUATION
Post-Op Assessment Note    CV Status:  Stable  Pain Score: 0    Pain management: adequate     Mental Status:  Alert and awake   Hydration Status:  Euvolemic and stable   PONV Controlled:  Controlled   Airway Patency:  Patent      Post Op Vitals Reviewed: Yes      Staff: Anesthesiologist         No complications documented      BP      Temp     Pulse     Resp      SpO2      /76   Pulse 68   Temp (!) 97 1 °F (36 2 °C) (Temporal)   Resp 20   Ht 5' 3" (1 6 m)   Wt 97 5 kg (215 lb)   SpO2 98%   BMI 38 09 kg/m²

## 2022-08-19 NOTE — H&P
History and Physical -  [facility]  Prem Quinonez 47 y o  female MRN: [id number]                  HPI: Prem Quinonez is a 47y o  year old female who presents for iron deficiency, colon cancer screening  REVIEW OF SYSTEMS: Per the HPI, and otherwise unremarkable  Historical Information   Past Medical History:   Diagnosis Date    Anxiety     Depression     Hiatal hernia     PONV (postoperative nausea and vomiting)     Pregnant     as a child     Past Surgical History:   Procedure Laterality Date    BARIATRIC SURGERY  2014    BREAST BIOPSY  1995    EGD      HYSTERECTOMY  2018     Social History   Social History     Substance and Sexual Activity   Alcohol Use Yes    Alcohol/week: 4 0 standard drinks    Types: 4 Glasses of wine per week    Comment: 1 glass of red wine nightly     Social History     Substance and Sexual Activity   Drug Use Never     Social History     Tobacco Use   Smoking Status Never Smoker   Smokeless Tobacco Never Used     Family History   Problem Relation Age of Onset    Diabetes Mother     Coronary artery disease Father        Meds/Allergies     (Not in a hospital admission)      Allergies   Allergen Reactions    Sulfa Antibiotics Rash       Objective     Blood pressure 136/73, pulse 63, temperature (!) 97 1 °F (36 2 °C), temperature source Temporal, resp  rate 18, height 5' 3" (1 6 m), weight 97 5 kg (215 lb), SpO2 98 %  PHYSICAL EXAMINATION:    General Appearance:   Alert, cooperative, no distress   HEENT:  Normocephalic, atraumatic, anicteric  Neck supple, symmetrical, trachea midline  Lungs:   Equal chest rise and unlabored breathing, normal effort, no coughing  Cardiovascular:   No visualized JVD  Abdomen:   No abdominal distension  Skin:   No jaundice, rashes, or lesions  Musculoskeletal:   Normal range of motion visualized  Psych:  Normal affect and normal insight  Neuro:  Alert and appropriate             ASSESSMENT/PLAN: This is a 47y o  year old female here for EGD and colonoscopy, and she is stable and optimized for her procedure
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
## 2022-09-14 ENCOUNTER — VBI (OUTPATIENT)
Dept: ADMINISTRATIVE | Facility: OTHER | Age: 52
End: 2022-09-14

## 2022-09-16 ENCOUNTER — OFFICE VISIT (OUTPATIENT)
Dept: FAMILY MEDICINE CLINIC | Facility: CLINIC | Age: 52
End: 2022-09-16
Payer: COMMERCIAL

## 2022-09-16 VITALS
WEIGHT: 215 LBS | HEART RATE: 57 BPM | DIASTOLIC BLOOD PRESSURE: 84 MMHG | OXYGEN SATURATION: 99 % | SYSTOLIC BLOOD PRESSURE: 130 MMHG | HEIGHT: 63 IN | BODY MASS INDEX: 38.09 KG/M2

## 2022-09-16 DIAGNOSIS — N95.1 HOT FLASHES DUE TO MENOPAUSE: Primary | ICD-10-CM

## 2022-09-16 DIAGNOSIS — R79.0 LOW FERRITIN LEVEL: ICD-10-CM

## 2022-09-16 DIAGNOSIS — E66.09 CLASS 2 OBESITY DUE TO EXCESS CALORIES WITH BODY MASS INDEX (BMI) OF 38.0 TO 38.9 IN ADULT, UNSPECIFIED WHETHER SERIOUS COMORBIDITY PRESENT: ICD-10-CM

## 2022-09-16 PROCEDURE — 99214 OFFICE O/P EST MOD 30 MIN: CPT | Performed by: PHYSICIAN ASSISTANT

## 2022-09-16 RX ORDER — FLUOXETINE 10 MG/1
10 CAPSULE ORAL DAILY
Qty: 30 CAPSULE | Refills: 1 | Status: SHIPPED | OUTPATIENT
Start: 2022-09-16 | End: 2022-10-09
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
## 2022-10-01 ENCOUNTER — APPOINTMENT (OUTPATIENT)
Dept: LAB | Age: 52
End: 2022-10-01
Payer: COMMERCIAL

## 2022-10-01 DIAGNOSIS — E66.09 CLASS 2 OBESITY DUE TO EXCESS CALORIES WITH BODY MASS INDEX (BMI) OF 38.0 TO 38.9 IN ADULT, UNSPECIFIED WHETHER SERIOUS COMORBIDITY PRESENT: ICD-10-CM

## 2022-10-01 DIAGNOSIS — R79.0 LOW FERRITIN LEVEL: ICD-10-CM

## 2022-10-01 LAB
ALBUMIN SERPL BCP-MCNC: 3.7 G/DL (ref 3.5–5)
ALP SERPL-CCNC: 68 U/L (ref 46–116)
ALT SERPL W P-5'-P-CCNC: 25 U/L (ref 12–78)
ANION GAP SERPL CALCULATED.3IONS-SCNC: 3 MMOL/L (ref 4–13)
AST SERPL W P-5'-P-CCNC: 21 U/L (ref 5–45)
BASOPHILS # BLD AUTO: 0.04 THOUSANDS/ΜL (ref 0–0.1)
BASOPHILS NFR BLD AUTO: 1 % (ref 0–1)
BILIRUB SERPL-MCNC: 0.69 MG/DL (ref 0.2–1)
BUN SERPL-MCNC: 11 MG/DL (ref 5–25)
CALCIUM SERPL-MCNC: 9.3 MG/DL (ref 8.3–10.1)
CHLORIDE SERPL-SCNC: 106 MMOL/L (ref 96–108)
CO2 SERPL-SCNC: 26 MMOL/L (ref 21–32)
CREAT SERPL-MCNC: 0.87 MG/DL (ref 0.6–1.3)
EOSINOPHIL # BLD AUTO: 0.24 THOUSAND/ΜL (ref 0–0.61)
EOSINOPHIL NFR BLD AUTO: 6 % (ref 0–6)
ERYTHROCYTE [DISTWIDTH] IN BLOOD BY AUTOMATED COUNT: 12.4 % (ref 11.6–15.1)
FERRITIN SERPL-MCNC: 129 NG/ML (ref 8–388)
GFR SERPL CREATININE-BSD FRML MDRD: 77 ML/MIN/1.73SQ M
GLUCOSE P FAST SERPL-MCNC: 85 MG/DL (ref 65–99)
HCT VFR BLD AUTO: 41.6 % (ref 34.8–46.1)
HGB BLD-MCNC: 13.2 G/DL (ref 11.5–15.4)
IMM GRANULOCYTES # BLD AUTO: 0.01 THOUSAND/UL (ref 0–0.2)
IMM GRANULOCYTES NFR BLD AUTO: 0 % (ref 0–2)
IRON SERPL-MCNC: 107 UG/DL (ref 50–170)
LYMPHOCYTES # BLD AUTO: 1.61 THOUSANDS/ΜL (ref 0.6–4.47)
LYMPHOCYTES NFR BLD AUTO: 37 % (ref 14–44)
MCH RBC QN AUTO: 30.5 PG (ref 26.8–34.3)
MCHC RBC AUTO-ENTMCNC: 31.7 G/DL (ref 31.4–37.4)
MCV RBC AUTO: 96 FL (ref 82–98)
MONOCYTES # BLD AUTO: 0.37 THOUSAND/ΜL (ref 0.17–1.22)
MONOCYTES NFR BLD AUTO: 9 % (ref 4–12)
NEUTROPHILS # BLD AUTO: 2.08 THOUSANDS/ΜL (ref 1.85–7.62)
NEUTS SEG NFR BLD AUTO: 47 % (ref 43–75)
NRBC BLD AUTO-RTO: 0 /100 WBCS
PLATELET # BLD AUTO: 270 THOUSANDS/UL (ref 149–390)
PMV BLD AUTO: 10.8 FL (ref 8.9–12.7)
POTASSIUM SERPL-SCNC: 4.4 MMOL/L (ref 3.5–5.3)
PROT SERPL-MCNC: 7.6 G/DL (ref 6.4–8.4)
RBC # BLD AUTO: 4.33 MILLION/UL (ref 3.81–5.12)
SODIUM SERPL-SCNC: 135 MMOL/L (ref 135–147)
TIBC SERPL-MCNC: 318 UG/DL (ref 250–450)
WBC # BLD AUTO: 4.35 THOUSAND/UL (ref 4.31–10.16)

## 2022-10-01 PROCEDURE — 36415 COLL VENOUS BLD VENIPUNCTURE: CPT

## 2022-10-01 PROCEDURE — 83550 IRON BINDING TEST: CPT

## 2022-10-01 PROCEDURE — 83540 ASSAY OF IRON: CPT

## 2022-10-01 PROCEDURE — 85025 COMPLETE CBC W/AUTO DIFF WBC: CPT

## 2022-10-01 PROCEDURE — 80053 COMPREHEN METABOLIC PANEL: CPT

## 2022-10-01 PROCEDURE — 82728 ASSAY OF FERRITIN: CPT

## 2022-10-09 DIAGNOSIS — N95.1 HOT FLASHES DUE TO MENOPAUSE: ICD-10-CM

## 2022-10-09 PROBLEM — M25.562 LEFT KNEE PAIN: Status: RESOLVED | Noted: 2021-04-03 | Resolved: 2022-10-09

## 2022-10-09 RX ORDER — FLUOXETINE 10 MG/1
CAPSULE ORAL
Qty: 90 CAPSULE | Refills: 1 | Status: SHIPPED | OUTPATIENT
Start: 2022-10-09

## 2022-10-09 NOTE — ASSESSMENT & PLAN NOTE
Referred to weight management  BMI Counseling: Body mass index is 38 09 kg/m²  The BMI is above normal  Nutrition recommendations include reducing portion sizes and 3-5 servings of fruits/vegetables daily  Exercise recommendations include moderate aerobic physical activity for 150 minutes/week and exercising 3-5 times per week  Patient referred to weight management due to patient being obese

## 2022-10-09 NOTE — ASSESSMENT & PLAN NOTE
Not helped by Paxil  Will transition to Prozac 10 mg daily  Call with any concerns prior to next visit

## 2022-10-28 ENCOUNTER — TELEPHONE (OUTPATIENT)
Dept: ADMINISTRATIVE | Facility: OTHER | Age: 52
End: 2022-10-28

## 2022-10-28 NOTE — TELEPHONE ENCOUNTER
----- Message from Nicholas Mccall sent at 10/28/2022  7:46 AM EDT -----  Regarding: care gap request  10/28/22 7:46 AM    Hello, our patient attached above has had Mammogram completed/performed  Please assist in updating the patient chart by pulling the Care Everywhere (CE) document  The date of service is 10/27/22       Thank you,  Nicholas Mccall  [facility] [address]

## 2022-10-31 NOTE — TELEPHONE ENCOUNTER
Upon review of the In Basket request we have found/obtained the documentation  The status of this report is in progress/pending/awaiting final  Due to protocols, we are unable to hold requests for resulting/linking of a pending/ in progress/awaiting final items and are unable to proceed  Any additional questions or concerns should be emailed to the Practice Liaisons via the appropriate education email address, please do not reply via In Basket      Thank you  Keren Campa

## 2022-11-01 ENCOUNTER — TELEPHONE (OUTPATIENT)
Dept: ADMINISTRATIVE | Facility: OTHER | Age: 52
End: 2022-11-01

## 2022-11-01 NOTE — TELEPHONE ENCOUNTER
----- Message from Nicholas cMcall sent at 11/1/2022 11:32 AM EDT -----  Regarding: care gap request  11/01/22 11:32 AM    Hello, our patient attached above has had Mammogram completed/performed  Please assist in updating the patient chart by pulling the Care Everywhere (CE) document  The date of service is 10/27/2022       Thank you,  Nicholas Mccall  [facility] [address]

## 2022-11-01 NOTE — TELEPHONE ENCOUNTER
Upon review of the In Basket request we have found/obtained the documentation  The status of this report is in progress/pending/awaiting final  Due to protocols, we are unable to hold requests for resulting/linking of a pending/ in progress/awaiting final items and are unable to proceed  Any additional questions or concerns should be emailed to the Practice Liaisons via the appropriate education email address, please do not reply via In Basket      Thank you  Edil Guido

## 2022-11-04 ENCOUNTER — TELEPHONE (OUTPATIENT)
Dept: ADMINISTRATIVE | Facility: OTHER | Age: 52
End: 2022-11-04

## 2022-11-04 NOTE — TELEPHONE ENCOUNTER
----- Message from Yun Hernandez sent at 11/3/2022  2:18 PM EDT -----  Regarding: care gap request  11/03/22 2:18 PM    Hello, our patient attached above has had Mammogram completed/performed  Please assist in updating the patient chart by pulling the Care Everywhere (CE) document  The date of service is 10/27/22       Thank you,  Yun Hernandez  [facility] [address]

## 2022-11-04 NOTE — TELEPHONE ENCOUNTER
Upon review of the In Basket request we were able to locate, review, and update the patient chart as requested for Mammogram     Any additional questions or concerns should be emailed to the Practice Liaisons via the appropriate education email address, please do not reply via In Basket      Thank you  Susan Schulz

## 2023-01-18 ENCOUNTER — RA CDI HCC (OUTPATIENT)
Dept: OTHER | Facility: HOSPITAL | Age: 53
End: 2023-01-18

## 2023-01-18 NOTE — PROGRESS NOTES
[facility]  coding opportunities       Chart reviewed, no opportunity found: CHART REVIEWED, NO OPPORTUNITY FOUND        Patients Insurance        Commercial Insurance: [address] [place] [address]

## 2023-02-01 ENCOUNTER — OFFICE VISIT (OUTPATIENT)
Dept: FAMILY MEDICINE CLINIC | Facility: CLINIC | Age: 53
End: 2023-02-01

## 2023-02-01 VITALS
HEIGHT: 63 IN | OXYGEN SATURATION: 99 % | HEART RATE: 60 BPM | SYSTOLIC BLOOD PRESSURE: 120 MMHG | DIASTOLIC BLOOD PRESSURE: 80 MMHG | BODY MASS INDEX: 38.88 KG/M2 | TEMPERATURE: 98.6 F | WEIGHT: 219.4 LBS | RESPIRATION RATE: 16 BRPM

## 2023-02-01 DIAGNOSIS — Z90.3 POSTGASTRECTOMY MALABSORPTION: ICD-10-CM

## 2023-02-01 DIAGNOSIS — R79.0 LOW FERRITIN LEVEL: ICD-10-CM

## 2023-02-01 DIAGNOSIS — E67.0 HYPERVITAMINOSIS A: ICD-10-CM

## 2023-02-01 DIAGNOSIS — Z23 NEED FOR SHINGLES VACCINE: ICD-10-CM

## 2023-02-01 DIAGNOSIS — N95.1 HOT FLASHES DUE TO MENOPAUSE: ICD-10-CM

## 2023-02-01 DIAGNOSIS — E66.09 CLASS 2 OBESITY DUE TO EXCESS CALORIES WITH BODY MASS INDEX (BMI) OF 38.0 TO 38.9 IN ADULT, UNSPECIFIED WHETHER SERIOUS COMORBIDITY PRESENT: Primary | ICD-10-CM

## 2023-02-01 DIAGNOSIS — K91.2 POSTGASTRECTOMY MALABSORPTION: ICD-10-CM

## 2023-02-01 DIAGNOSIS — Z13.1 DIABETES MELLITUS SCREENING: ICD-10-CM

## 2023-02-01 DIAGNOSIS — Z23 INFLUENZA VACCINE NEEDED: ICD-10-CM
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
## 2023-02-03 NOTE — ASSESSMENT & PLAN NOTE
Recently started weight watchers a few weeks ago  She currently weighs 4 pounds more than her last visit but states that she lost about 8 pounds since joining  She will continue with her improved diet as well as trying to walk regularly with her daughter  BMI Counseling: Body mass index is 38 86 kg/m²  The BMI is above normal  Nutrition recommendations include decreasing overall calorie intake  Exercise recommendations include exercising 3-5 times per week

## 2023-04-08 DIAGNOSIS — N95.1 HOT FLASHES DUE TO MENOPAUSE: ICD-10-CM

## 2023-04-08 RX ORDER — FLUOXETINE 10 MG/1
CAPSULE ORAL
Qty: 90 CAPSULE | Refills: 2 | Status: SHIPPED | OUTPATIENT
Start: 2023-04-08